# Patient Record
Sex: FEMALE | Race: WHITE | NOT HISPANIC OR LATINO | Employment: FULL TIME | URBAN - METROPOLITAN AREA
[De-identification: names, ages, dates, MRNs, and addresses within clinical notes are randomized per-mention and may not be internally consistent; named-entity substitution may affect disease eponyms.]

---

## 2017-01-16 RX ORDER — FERROUS SULFATE 325(65) MG
TABLET ORAL
Qty: 30 TABLET | Refills: 2 | OUTPATIENT
Start: 2017-01-16

## 2017-01-19 ENCOUNTER — TELEPHONE (OUTPATIENT)
Dept: ONCOLOGY | Facility: CLINIC | Age: 37
End: 2017-01-19

## 2017-01-19 NOTE — TELEPHONE ENCOUNTER
----- Message from Mary Beth Jaquez sent at 1/19/2017  3:31 PM EST -----  Regarding: MICHAEL/ SCRIPT  Contact: [phone]  PATIENT STATED HER SCRIPT NEEDS A PA. (XARELTO)  PLEASE CALL PT TO DISCUSS    PHARMACY [facility] IN [place]  [id number] [phone]

## 2017-01-19 NOTE — TELEPHONE ENCOUNTER
Advised patient I would speak with Dr Mix regarding this, since she has not been seen in over a year. Per last office note, she needed to be seen again in 3 months. Will call patient back tomorrow after discussion

## 2017-01-23 ENCOUNTER — TELEPHONE (OUTPATIENT)
Dept: ONCOLOGY | Facility: CLINIC | Age: 37
End: 2017-01-23

## 2017-01-23 NOTE — TELEPHONE ENCOUNTER
----- Message from Nohemi De La Cruz sent at 1/23/2017  3:21 PM EST -----  Regarding: BADIN - MEDICATION PA  Contact: [phone]  Patient has had a massive embolism that  blocked off one lung and the swelled the heart last January and she wants to make sure she has her medication. She had talked to Tori last week regarding her insurance needing a PA on the Xarelto .      Patient is almost out of medication.       Please call patient [phone]

## 2017-02-06 ENCOUNTER — OFFICE VISIT (OUTPATIENT)
Dept: FAMILY MEDICINE CLINIC | Facility: CLINIC | Age: 37
End: 2017-02-06

## 2017-02-06 VITALS
DIASTOLIC BLOOD PRESSURE: 66 MMHG | BODY MASS INDEX: 32.66 KG/M2 | HEART RATE: 72 BPM | SYSTOLIC BLOOD PRESSURE: 110 MMHG | WEIGHT: 227.6 LBS | TEMPERATURE: 98.8 F | RESPIRATION RATE: 20 BRPM

## 2017-02-06 DIAGNOSIS — H66.002 ACUTE SUPPURATIVE OTITIS MEDIA OF LEFT EAR WITHOUT SPONTANEOUS RUPTURE OF TYMPANIC MEMBRANE, RECURRENCE NOT SPECIFIED: Primary | ICD-10-CM

## 2017-02-06 DIAGNOSIS — R05.9 COUGH: ICD-10-CM

## 2017-02-06 DIAGNOSIS — J02.9 SORE THROAT: ICD-10-CM

## 2017-02-06 LAB
EXPIRATION DATE: NORMAL
EXPIRATION DATE: NORMAL
FLUAV AG NPH QL: NEGATIVE
FLUBV AG NPH QL: NEGATIVE
INTERNAL CONTROL: NORMAL
INTERNAL CONTROL: NORMAL
Lab: NORMAL
Lab: NORMAL
S PYO AG THROAT QL: NEGATIVE

## 2017-02-06 PROCEDURE — 87804 INFLUENZA ASSAY W/OPTIC: CPT | Performed by: FAMILY MEDICINE

## 2017-02-06 PROCEDURE — 99213 OFFICE O/P EST LOW 20 MIN: CPT | Performed by: FAMILY MEDICINE

## 2017-02-06 PROCEDURE — 87880 STREP A ASSAY W/OPTIC: CPT | Performed by: FAMILY MEDICINE

## 2017-02-06 RX ORDER — DEXTROMETHORPHAN HYDROBROMIDE AND PROMETHAZINE HYDROCHLORIDE 15; 6.25 MG/5ML; MG/5ML
5 SYRUP ORAL 4 TIMES DAILY PRN
Qty: 180 ML | Refills: 0 | Status: SHIPPED | OUTPATIENT
Start: 2017-02-06 | End: 2017-02-08

## 2017-02-06 RX ORDER — METHYLPREDNISOLONE 4 MG/1
TABLET ORAL
Qty: 21 TABLET | Refills: 0 | Status: SHIPPED | OUTPATIENT
Start: 2017-02-06 | End: 2017-03-03

## 2017-02-06 RX ORDER — CEFDINIR 300 MG/1
300 CAPSULE ORAL 2 TIMES DAILY
Qty: 14 CAPSULE | Refills: 0 | Status: SHIPPED | OUTPATIENT
Start: 2017-02-06 | End: 2017-02-13

## 2017-02-06 RX ORDER — FERROUS SULFATE 325(65) MG
325 TABLET ORAL
COMMUNITY
End: 2017-12-27

## 2017-02-06 NOTE — PROGRESS NOTES
Subjective   Elisabeth Bustos is a 37 y.o. female.     History of Present Illness     Upper Respiratory Infection  Patient complains of symptoms of a URI. Symptoms include congestion, fever 101, non productive cough, sore throat and body aches. Onset of symptoms was 1 day ago, and has been gradually worsening since that time. Treatment to date: Theraflu.   She is a  and is exposed to multiple sick people     The following portions of the patient's history were reviewed and updated as appropriate: allergies, current medications, past family history, past medical history, past social history, past surgical history and problem list.    Review of Systems   Constitutional: Positive for chills, fatigue and fever.   HENT: Positive for sore throat. Negative for congestion, ear discharge, ear pain, postnasal drip, rhinorrhea, sinus pressure and trouble swallowing.    Respiratory: Positive for cough.    Cardiovascular: Negative for chest pain.   Gastrointestinal: Positive for diarrhea. Negative for nausea and vomiting.   Skin: Negative for rash.   Neurological: Positive for headaches. Negative for dizziness.       Objective   Physical Exam   Constitutional: She is oriented to person, place, and time. She appears well-developed and well-nourished.   HENT:   Head: Normocephalic and atraumatic.   Right Ear: Hearing, tympanic membrane, external ear and ear canal normal.   Left Ear: Hearing, external ear and ear canal normal. Tympanic membrane is injected and erythematous. A middle ear effusion (suppurative) is present.   Nose: Nose normal.   Mouth/Throat: Uvula is midline, oropharynx is clear and moist and mucous membranes are normal.   Eyes: Conjunctivae and EOM are normal.   Neck: Normal range of motion. Neck supple.   Cardiovascular: Normal rate, regular rhythm and normal heart sounds.    No murmur heard.  Pulmonary/Chest: Effort normal and breath sounds normal. No respiratory distress. She has no wheezes.    Musculoskeletal: She exhibits no edema.   Lymphadenopathy:     She has no cervical adenopathy.   Neurological: She is alert and oriented to person, place, and time.   Skin: Skin is warm and dry.   Psychiatric: She has a normal mood and affect. Her behavior is normal.   Nursing note and vitals reviewed.      Assessment/Plan   Elisabeth was seen today for cough, sore throat, fever, uri and generalized body aches.    Diagnoses and all orders for this visit:    Acute suppurative otitis media of left ear without spontaneous rupture of tympanic membrane, recurrence not specified  -     cefdinir (OMNICEF) 300 MG capsule; Take 1 capsule by mouth 2 (Two) Times a Day for 7 days.  -     MethylPREDNISolone (MEDROL, NANCI,) 4 MG tablet; Take as directed on package instructions.    Cough  -     POC Rapid Strep A  -     POC Influenza A / B  -     MethylPREDNISolone (MEDROL, NANCI,) 4 MG tablet; Take as directed on package instructions.  -     promethazine-dextromethorphan (PROMETHAZINE-DM) 6.25-15 MG/5ML syrup; Take 5 mL by mouth 4 (Four) Times a Day As Needed for cough.    Sore throat  -     POC Rapid Strep A  -     POC Influenza A / B  -     MethylPREDNISolone (MEDROL, NANCI,) 4 MG tablet; Take as directed on package instructions.      Condition treated with Omnicef, steroid pack, and cough syrup  Strep and influenza swab negative   Go to the nearest ER or return to clinic if symptoms worsen, fever/chill develop    Mariann Vega DO

## 2017-02-06 NOTE — PATIENT INSTRUCTIONS
Go to the nearest ER or return to clinic if symptoms worsen, fever/chill develop      Otitis Media, Adult  Otitis media is redness, soreness, and puffiness (swelling) in the space just behind your eardrum (middle ear). It may be caused by allergies or infection. It often happens along with a cold.  HOME CARE  · Take your medicine as told. Finish it even if you start to feel better.  · Only take over-the-counter or prescription medicines for pain, discomfort, or fever as told by your doctor.  · Follow up with your doctor as told.  GET HELP IF:  · You have otitis media only in one ear, or bleeding from your nose, or both.  · You notice a lump on your neck.  · You are not getting better in 3-5 days.  · You feel worse instead of better.  GET HELP RIGHT AWAY IF:   · You have pain that is not helped with medicine.  · You have puffiness, redness, or pain around your ear.  · You get a stiff neck.  · You cannot move part of your face (paralysis).  · You notice that the bone behind your ear hurts when you touch it.  MAKE SURE YOU:   · Understand these instructions.  · Will watch your condition.  · Will get help right away if you are not doing well or get worse.     This information is not intended to replace advice given to you by your health care provider. Make sure you discuss any questions you have with your health care provider.     Document Released: 06/05/2009 Document Revised: 01/08/2016 Document Reviewed: 07/15/2014  [employer] Interactive Patient Education ©2016 [employer]

## 2017-02-08 ENCOUNTER — TELEPHONE (OUTPATIENT)
Dept: FAMILY MEDICINE CLINIC | Facility: CLINIC | Age: 37
End: 2017-02-08

## 2017-02-08 RX ORDER — BROMPHENIRAMINE MALEATE, PSEUDOEPHEDRINE HYDROCHLORIDE, AND DEXTROMETHORPHAN HYDROBROMIDE 2; 30; 10 MG/5ML; MG/5ML; MG/5ML
5 SYRUP ORAL 4 TIMES DAILY PRN
Qty: 118 ML | Refills: 0 | Status: SHIPPED | OUTPATIENT
Start: 2017-02-08 | End: 2017-12-27

## 2017-02-08 NOTE — TELEPHONE ENCOUNTER
----- Message from Talia Ware sent at 2/8/2017  9:32 AM EST -----  Contact: MEREDITH NIXON  THE PATIENT CALLED TO ADVISE THAT HER COUGH SYRUP IS NOT WORKING AND SHE IS OUT IS REQUESTING THAT SOMETHING STRONGER BE SCRIPTED CAN THIS BE DONE HER PHARMACY IS [facility] PATIENT CAN BE REACHE GARETT  OK TO LEAVE [facility] [place]

## 2017-02-09 ENCOUNTER — OFFICE VISIT (OUTPATIENT)
Dept: CARDIOLOGY | Facility: CLINIC | Age: 37
End: 2017-02-09

## 2017-02-09 VITALS
WEIGHT: 229.8 LBS | BODY MASS INDEX: 32.9 KG/M2 | HEIGHT: 70 IN | HEART RATE: 59 BPM | DIASTOLIC BLOOD PRESSURE: 68 MMHG | SYSTOLIC BLOOD PRESSURE: 114 MMHG

## 2017-02-09 DIAGNOSIS — I26.99 BILATERAL PULMONARY EMBOLISM (HCC): Primary | ICD-10-CM

## 2017-02-09 PROCEDURE — 99213 OFFICE O/P EST LOW 20 MIN: CPT | Performed by: INTERNAL MEDICINE

## 2017-02-09 NOTE — PROGRESS NOTES
Elisabeth Bustos  1980  [phone]    2017    Remi Newman MD    Chief Complaint   Patient presents with   • bilateral pulmonary emboli     PROBLEM LIST:   1. Bilateral pulmonary emboli.  a. Significantly greater on the right than the left.  b. Echocardiogram 2016, revealing an LVEF  of 50% to 55% with a dilated RV and reduced function noted.  Trace AI, mild MR, moderate TR with no RVSP of 60-65 mmHg.    c. Cardiac catheterization, 2016: Successful EKOS placement to the right pulmonary artery, x24 hours.  d. 2016, TTE: EF < 50%.  e. Her hypercoagulable workup was negative.   f. Limited echo, 2016: RVSP <30. Normal RV size and function.   g. Family history of pulmonary emboli in father and maternal grandmother, DVT in mother and paternal grandmother.   2. Oral contraceptives use at time of pulmonary emboli.   3. Migraine headache.   4. Exercise-induced asthma.  5.  Past surgical history:   a. Bilateral hernia repair.   b.   section.   c. Laser laryngoscopy.   d. Breast augmentation.   e. Cholecystectomy, 2015.    No Known Allergies    Current Medications:      Current Outpatient Prescriptions:   •  Acetaminophen (TYLENOL PO), Take  by mouth as needed., Disp: , Rfl:   •  albuterol (PROVENTIL HFA;VENTOLIN HFA) 108 (90 BASE) MCG/ACT inhaler, Inhale 2 puffs every 4 (four) hours as needed for wheezing., Disp: 6.7 g, Rfl: 11  •  bisoprolol (ZEBETA) 5 MG tablet, Take 1 tablet by mouth daily., Disp: 90 tablet, Rfl: 3  •  brompheniramine-pseudoephedrine-DM 30-2-10 MG/5ML syrup, Take 5 mL by mouth 4 (Four) Times a Day As Needed for congestion or cough., Disp: 118 mL, Rfl: 0  •  cefdinir (OMNICEF) 300 MG capsule, Take 1 capsule by mouth 2 (Two) Times a Day for 7 days., Disp: 14 capsule, Rfl: 0  •  EPINEPHrine (EPIPEN 2-NANCI) 0.3 MG/0.3ML solution auto-injector injection, Use x 1 if needed for sting reaction, Disp: 1 each, Rfl: 2  •  ferrous sulfate 325 (65 FE) MG tablet,  "Take 325 mg by mouth Daily With Breakfast., Disp: , Rfl:   •  Lidocaine-Prilocaine, Bulk, 2.5-2.5 % cream, As Needed., Disp: , Rfl:   •  MethylPREDNISolone (MEDROL, NANCI,) 4 MG tablet, Take as directed on package instructions., Disp: 21 tablet, Rfl: 0  •  rivaroxaban (XARELTO) 20 MG tablet, Take 1 tablet by mouth Daily., Disp: 90 tablet, Rfl: 0    HPI    Ms. Bustos presents today for 6 month follow up for bilateral pulmonary emboli. Since last visit, she reports feeling well from a cardiac standpoint. Her breathing has improved since being prescribed an albuterol inhaler for exercise-induced asthma. Patient denies chest pain, palpitations, edema, PND, orthopnea, dizziness, and syncope. She keeps active doing one hour of aerobic exercise, weights, and high-intensity dance classes 3-4 times a week with no cardiac complaints; in addition, she has been consuming a diet low in sugar and processed foods. Despite these efforts she continues to have difficulty losing weight, stating that she has gained 30 lbs over the past three years. She is unsure as to whether her weight gain is hormonal, but mentions that she has had to stop taking oral contraceptives secondary to her pulmonary emboli. She notes that her father and maternal grandmother also suffered pulmonary emboli. Her mother and paternal grandmother had a DVT.      The following portions of the patient's history were reviewed and updated as appropriate: allergies, current medications and problem list.    Pertinent positives as listed in the HPI.  All other systems reviewed are negative.    Vitals:    02/09/17 1430   BP: 114/68   BP Location: Left arm   Patient Position: Sitting   Pulse: 59   Weight: 229 lb 12.8 oz (104 kg)   Height: 70\" (177.8 cm)     General: Alert and oriented  Neck: Jugular venous pressure is within normal limits. Carotids have normal upstrokes without bruits.   Cardiovascular: Heart has a nondisplaced focal PMI. Regular rate and rhythm without " murmur, gallop or rub.  Lungs: Clear without rales or wheezes. Equal expansion is noted.   Extremities: Show no edema.  Skin: warm and dry.  Neurologic: nonfocal    Diagnostic Data:    Lab Results   Component Value Date    TSH 1.224 09/15/2016    F8ZTTSE 7.40 09/15/2016     Procedures    Assessment:      ICD-10-CM ICD-9-CM   1. Bilateral pulmonary embolism I26.99 415.19     Plan:    1. Recommended carb cycling diet for weight loss.   2. Will call Dr. Mix after she sees him tomorrow.   3. Continue current medications.  4. F/up in 6 months or sooner if needed.    Scribed for Klarissa Green MD by Edith Mcdowell. 2/9/2017  2:51 PM    I Klarissa Green MD personally performed the services described in this documentation as scribed by the above individual in my presence, and it is both accurate and complete.

## 2017-02-10 ENCOUNTER — APPOINTMENT (OUTPATIENT)
Dept: LAB | Facility: HOSPITAL | Age: 37
End: 2017-02-10

## 2017-02-10 ENCOUNTER — OFFICE VISIT (OUTPATIENT)
Dept: ONCOLOGY | Facility: CLINIC | Age: 37
End: 2017-02-10

## 2017-02-10 VITALS
BODY MASS INDEX: 32.78 KG/M2 | HEART RATE: 50 BPM | RESPIRATION RATE: 16 BRPM | DIASTOLIC BLOOD PRESSURE: 70 MMHG | TEMPERATURE: 98.2 F | HEIGHT: 70 IN | WEIGHT: 229 LBS | SYSTOLIC BLOOD PRESSURE: 119 MMHG

## 2017-02-10 DIAGNOSIS — I26.99 BILATERAL PULMONARY EMBOLISM (HCC): Primary | ICD-10-CM

## 2017-02-10 LAB
ALBUMIN SERPL-MCNC: 4 G/DL (ref 3.2–4.8)
ALBUMIN/GLOB SERPL: 1.7 G/DL (ref 1.5–2.5)
ALP SERPL-CCNC: 64 U/L (ref 25–100)
ALT SERPL W P-5'-P-CCNC: 15 U/L (ref 7–40)
ANION GAP SERPL CALCULATED.3IONS-SCNC: 6 MMOL/L (ref 3–11)
AST SERPL-CCNC: 13 U/L (ref 0–33)
BILIRUB SERPL-MCNC: 0.4 MG/DL (ref 0.3–1.2)
BUN BLD-MCNC: 16 MG/DL (ref 9–23)
BUN/CREAT SERPL: 20 (ref 7–25)
CALCIUM SPEC-SCNC: 9.1 MG/DL (ref 8.7–10.4)
CHLORIDE SERPL-SCNC: 105 MMOL/L (ref 99–109)
CO2 SERPL-SCNC: 30 MMOL/L (ref 20–31)
CREAT BLD-MCNC: 0.8 MG/DL (ref 0.6–1.3)
ERYTHROCYTE [DISTWIDTH] IN BLOOD BY AUTOMATED COUNT: 15.3 % (ref 11.3–14.5)
FERRITIN SERPL-MCNC: 10 NG/ML (ref 10–291)
FOLATE SERPL-MCNC: 6.28 NG/ML (ref 3.2–20)
GFR SERPL CREATININE-BSD FRML MDRD: 80 ML/MIN/1.73
GLOBULIN UR ELPH-MCNC: 2.3 GM/DL
GLUCOSE BLD-MCNC: 80 MG/DL (ref 70–100)
HCT VFR BLD AUTO: 38.8 % (ref 34.5–44)
HGB BLD-MCNC: 12.7 G/DL (ref 11.5–15.5)
IRON 24H UR-MRATE: 33 MCG/DL (ref 50–175)
IRON SATN MFR SERPL: 9 % (ref 15–50)
LYMPHOCYTES # BLD AUTO: 2 10*3/MM3 (ref 0.6–4.8)
LYMPHOCYTES NFR BLD AUTO: 26.6 % (ref 24–44)
MCH RBC QN AUTO: 26.1 PG (ref 27–31)
MCHC RBC AUTO-ENTMCNC: 32.9 G/DL (ref 32–36)
MCV RBC AUTO: 79.3 FL (ref 80–99)
MONOCYTES # BLD AUTO: 0.5 10*3/MM3 (ref 0–1)
MONOCYTES NFR BLD AUTO: 6.5 % (ref 0–12)
NEUTROPHILS # BLD AUTO: 5 10*3/MM3 (ref 1.5–8.3)
NEUTROPHILS NFR BLD AUTO: 66.9 % (ref 41–71)
PLATELET # BLD AUTO: 274 10*3/MM3 (ref 150–450)
PMV BLD AUTO: 8 FL (ref 6–12)
POTASSIUM BLD-SCNC: 4.4 MMOL/L (ref 3.5–5.5)
PROT SERPL-MCNC: 6.3 G/DL (ref 5.7–8.2)
RBC # BLD AUTO: 4.89 10*6/MM3 (ref 3.89–5.14)
SODIUM BLD-SCNC: 141 MMOL/L (ref 132–146)
TIBC SERPL-MCNC: 365 MCG/DL (ref 250–450)
VIT B12 BLD-MCNC: 363 PG/ML (ref 211–911)
WBC NRBC COR # BLD: 7.4 10*3/MM3 (ref 3.5–10.8)

## 2017-02-10 PROCEDURE — 81241 F5 GENE: CPT | Performed by: INTERNAL MEDICINE

## 2017-02-10 PROCEDURE — 82728 ASSAY OF FERRITIN: CPT | Performed by: INTERNAL MEDICINE

## 2017-02-10 PROCEDURE — 86147 CARDIOLIPIN ANTIBODY EA IG: CPT | Performed by: INTERNAL MEDICINE

## 2017-02-10 PROCEDURE — 85302 CLOT INHIBIT PROT C ANTIGEN: CPT | Performed by: INTERNAL MEDICINE

## 2017-02-10 PROCEDURE — 85300 ANTITHROMBIN III ACTIVITY: CPT | Performed by: INTERNAL MEDICINE

## 2017-02-10 PROCEDURE — 85306 CLOT INHIBIT PROT S FREE: CPT | Performed by: INTERNAL MEDICINE

## 2017-02-10 PROCEDURE — 85613 RUSSELL VIPER VENOM DILUTED: CPT | Performed by: INTERNAL MEDICINE

## 2017-02-10 PROCEDURE — 80053 COMPREHEN METABOLIC PANEL: CPT | Performed by: INTERNAL MEDICINE

## 2017-02-10 PROCEDURE — 36415 COLL VENOUS BLD VENIPUNCTURE: CPT | Performed by: INTERNAL MEDICINE

## 2017-02-10 PROCEDURE — 85210 CLOT FACTOR II PROTHROM SPEC: CPT | Performed by: INTERNAL MEDICINE

## 2017-02-10 PROCEDURE — 85730 THROMBOPLASTIN TIME PARTIAL: CPT | Performed by: INTERNAL MEDICINE

## 2017-02-10 PROCEDURE — 86146 BETA-2 GLYCOPROTEIN ANTIBODY: CPT | Performed by: INTERNAL MEDICINE

## 2017-02-10 PROCEDURE — 83550 IRON BINDING TEST: CPT | Performed by: INTERNAL MEDICINE

## 2017-02-10 PROCEDURE — 99214 OFFICE O/P EST MOD 30 MIN: CPT | Performed by: INTERNAL MEDICINE

## 2017-02-10 PROCEDURE — 85305 CLOT INHIBIT PROT S TOTAL: CPT | Performed by: INTERNAL MEDICINE

## 2017-02-10 PROCEDURE — 82607 VITAMIN B-12: CPT | Performed by: INTERNAL MEDICINE

## 2017-02-10 PROCEDURE — 82746 ASSAY OF FOLIC ACID SERUM: CPT | Performed by: INTERNAL MEDICINE

## 2017-02-10 PROCEDURE — 83540 ASSAY OF IRON: CPT | Performed by: INTERNAL MEDICINE

## 2017-02-10 PROCEDURE — 85303 CLOT INHIBIT PROT C ACTIVITY: CPT | Performed by: INTERNAL MEDICINE

## 2017-02-10 PROCEDURE — 85025 COMPLETE CBC W/AUTO DIFF WBC: CPT | Performed by: INTERNAL MEDICINE

## 2017-02-10 NOTE — PROGRESS NOTES
DATE OF VISIT: 2/10/2017    REASON FOR VISIT:   1. Bilateral PE.  2. Right lower extremity DVT.     HISTORY OF PRESENT ILLNESS: The patient is a very pleasant 36-year-old female  with past medical history significant for bilateral PE with right lower  extremity DVT diagnosed 01/04/2016. The patient is status post EKOS  thrombolysis with anticoagulation. Currently she is on Xarelto 20 mg p.o.  daily. The patient is here today in scheduled followup visit.     SUBJECTIVE: The patient has been doing fairly well. Her breathing is  significantly improved; however, she continues to complain of shortness of  breath with exertion.  She was diagnosed with exercise-induced asthma and she is currently on albuterol inhaler.  She also complains of difficulty losing weight despite being on diet as well as exercise program.     REVIEW OF SYSTEMS: All the other 9 systems are reviewed by me and negative  except what is mentioned in HPI.      PAST MEDICAL HISTORY/SOCIAL HISTORY/FAMILY HISTORY: Unchanged from my prior  documentation done 01/05/2016.      Review of Systems   Constitutional: Negative for activity change, appetite change, chills, fatigue, fever and unexpected weight change.   HENT: Negative for hearing loss, mouth sores, nosebleeds, sore throat and trouble swallowing.    Eyes: Negative for visual disturbance.   Respiratory: Negative for cough, chest tightness, shortness of breath and wheezing.    Cardiovascular: Negative for chest pain, palpitations and leg swelling.   Gastrointestinal: Negative for abdominal distention, abdominal pain, blood in stool, constipation, diarrhea, nausea, rectal pain and vomiting.   Endocrine: Negative for cold intolerance and heat intolerance.   Genitourinary: Negative for difficulty urinating, dysuria, frequency and urgency.   Musculoskeletal: Negative for arthralgias, back pain, gait problem, joint swelling and myalgias.   Skin: Negative for rash.   Neurological: Negative for dizziness,  "tremors, syncope, weakness, light-headedness, numbness and headaches.   Hematological: Negative for adenopathy. Does not bruise/bleed easily.   Psychiatric/Behavioral: Negative for confusion, sleep disturbance and suicidal ideas. The patient is not nervous/anxious.          Current Outpatient Prescriptions:   •  Acetaminophen (TYLENOL PO), Take  by mouth as needed., Disp: , Rfl:   •  albuterol (PROVENTIL HFA;VENTOLIN HFA) 108 (90 BASE) MCG/ACT inhaler, Inhale 2 puffs every 4 (four) hours as needed for wheezing., Disp: 6.7 g, Rfl: 11  •  bisoprolol (ZEBETA) 5 MG tablet, Take 1 tablet by mouth daily., Disp: 90 tablet, Rfl: 3  •  brompheniramine-pseudoephedrine-DM 30-2-10 MG/5ML syrup, Take 5 mL by mouth 4 (Four) Times a Day As Needed for congestion or cough., Disp: 118 mL, Rfl: 0  •  cefdinir (OMNICEF) 300 MG capsule, Take 1 capsule by mouth 2 (Two) Times a Day for 7 days., Disp: 14 capsule, Rfl: 0  •  EPINEPHrine (EPIPEN 2-NANCI) 0.3 MG/0.3ML solution auto-injector injection, Use x 1 if needed for sting reaction, Disp: 1 each, Rfl: 2  •  ferrous sulfate 325 (65 FE) MG tablet, Take 325 mg by mouth Daily With Breakfast., Disp: , Rfl:   •  Lidocaine-Prilocaine, Bulk, 2.5-2.5 % cream, As Needed., Disp: , Rfl:   •  MethylPREDNISolone (MEDROL, NANCI,) 4 MG tablet, Take as directed on package instructions., Disp: 21 tablet, Rfl: 0  •  rivaroxaban (XARELTO) 20 MG tablet, Take 1 tablet by mouth Daily., Disp: 90 tablet, Rfl: 0    PHYSICAL EXAMINATION:   Visit Vitals   • Ht 70\" (177.8 cm)   • BMI 32.97 kg/m2      ECOG Performance Status: 1 - Symptomatic but completely ambulatory  General Appearance:  alert, cooperative, no apparent distress and appears stated age   Neurologic/Psychiatric: A&O x 3, gait steady, appropriate affect, strength 5/5 in all muscle groups   HEENT:  Normocephalic, without obvious abnormality, mucous membranes moist   Neck: Supple, symmetrical, trachea midline, no adenopathy;  No thyromegaly, masses, or " tenderness   Lungs:   Clear to auscultation bilaterally; respirations regular, even, and unlabored bilaterally   Heart:  Regular rate and rhythm, no murmurs appreciated   Abdomen:   Soft, non-tender, non-distended and no organomegaly   Lymph nodes: No cervical, supraclavicular, inguinal or axillary adenopathy noted   Extremities: Normal, atraumatic; no clubbing, cyanosis, or edema    Skin: No rashes, ulcers, or suspicious lesions noted     Office Visit on 02/06/2017   Component Date Value Ref Range Status   • Rapid Strep A Screen 02/06/2017 Negative  Negative, VALID, INVALID, Not Performed Final   • Internal Control 02/06/2017 Passed  Passed Final   • Lot Number 02/06/2017 [id number]   Final   • Expiration Date 02/06/2017 3/18   Final   • Rapid Influenza A Ag 02/06/2017 NEGATIVE   Final   • Rapid Influenza B Ag 02/06/2017 NEGATIVE   Final   • Internal Control 02/06/2017 Passed  Passed Final   • Lot Number 02/06/2017 [id number]   Final   • Expiration Date 02/06/2017 12/17   Final        No results found.    ASSESSMENT: The patient is a very pleasant 36-year-old female with bilateral  PE.     PROBLEM LIST:  1. Bilateral PE diagnosed 01/04/2016 status post EKOS thrombolysis.  2. Right lower extremity DVT, above and below the knee.  3. On Xarelto 20 mg p.o. daily, started 01/06/2016.  4.  Obesity     PLAN:  1.  I will go ahead and order full thrombophilia workup today.  2. Patient was advised to stay off oral contraceptive pills.  3. Patient was advised to exercise and lose weight.  I told the patient obesity is a risk factor for venous thrombotic events.  He benefits thrombophilia workup comes back negative I think she will always be at risk of having another episode of blood clots.  4. I told the patient that she needs to wear compression stockings mainly at  work while she is standing up on her feet. Patient works as a hairdresser.   5. Patient will come back to see me in 3 weeks.  .       Rika Mix MD  2/10/2017

## 2017-02-11 LAB
CARDIOLIPIN IGG SER IA-ACNC: <9 GPL U/ML (ref 0–14)
CARDIOLIPIN IGM SER IA-ACNC: <9 MPL U/ML (ref 0–12)

## 2017-02-12 LAB
AT III PPP CHRO-ACNC: 94 % (ref 75–135)
B2 GLYCOPROT1 IGA SER-ACNC: <9 GPI IGA UNITS (ref 0–25)
B2 GLYCOPROT1 IGG SER-ACNC: <9 GPI IGG UNITS (ref 0–20)
B2 GLYCOPROT1 IGM SER-ACNC: <9 GPI IGM UNITS (ref 0–32)
PROTEIN S-FUNCTIONAL: 81 % (ref 63–140)
PROTHROM ACT/NOR PPP: 109 % (ref 50–154)

## 2017-02-15 LAB
PROT C AG PPP IA-ACNC: 119 % (ref 60–150)
PROT S FREE PPP-ACNC: 84 % (ref 57–157)
PROT S PPP-ACNC: 106 % (ref 60–150)
PRT C ACTIVITY (CHROMOGENIC): 148 %

## 2017-02-16 LAB
F5 GENE MUT ANL BLD/T: NORMAL
LABORATORY COMMENT REPORT: NORMAL
REF LAB TEST METHOD: NORMAL

## 2017-03-03 ENCOUNTER — OFFICE VISIT (OUTPATIENT)
Dept: ONCOLOGY | Facility: CLINIC | Age: 37
End: 2017-03-03

## 2017-03-03 VITALS
WEIGHT: 227 LBS | RESPIRATION RATE: 16 BRPM | SYSTOLIC BLOOD PRESSURE: 138 MMHG | HEIGHT: 70 IN | DIASTOLIC BLOOD PRESSURE: 73 MMHG | HEART RATE: 59 BPM | TEMPERATURE: 97.6 F | BODY MASS INDEX: 32.5 KG/M2

## 2017-03-03 DIAGNOSIS — Z86.718 HISTORY OF DEEP VENOUS THROMBOSIS: ICD-10-CM

## 2017-03-03 DIAGNOSIS — I26.99 BILATERAL PULMONARY EMBOLISM (HCC): Primary | ICD-10-CM

## 2017-03-03 PROCEDURE — 99214 OFFICE O/P EST MOD 30 MIN: CPT | Performed by: INTERNAL MEDICINE

## 2017-03-03 RX ORDER — FAMOTIDINE 10 MG/ML
20 INJECTION, SOLUTION INTRAVENOUS ONCE
Status: CANCELLED | OUTPATIENT
Start: 2017-03-10

## 2017-03-03 RX ORDER — SODIUM CHLORIDE 9 MG/ML
250 INJECTION, SOLUTION INTRAVENOUS ONCE
Status: CANCELLED | OUTPATIENT
Start: 2017-03-10

## 2017-03-03 RX ORDER — FAMOTIDINE 10 MG/ML
20 INJECTION, SOLUTION INTRAVENOUS ONCE
Status: CANCELLED | OUTPATIENT
Start: 2017-03-17

## 2017-03-03 RX ORDER — SODIUM CHLORIDE 9 MG/ML
250 INJECTION, SOLUTION INTRAVENOUS ONCE
Status: CANCELLED | OUTPATIENT
Start: 2017-03-17

## 2017-03-03 RX ORDER — DIPHENHYDRAMINE HCL 25 MG
25 CAPSULE ORAL ONCE
Status: CANCELLED | OUTPATIENT
Start: 2017-03-17

## 2017-03-03 RX ORDER — DIPHENHYDRAMINE HCL 25 MG
25 CAPSULE ORAL ONCE
Status: CANCELLED | OUTPATIENT
Start: 2017-03-10

## 2017-03-03 NOTE — PROGRESS NOTES
DATE OF VISIT: 3/3/2017    REASON FOR VISIT:   1. Bilateral PE.  2. Right lower extremity DVT.     HISTORY OF PRESENT ILLNESS: The patient is a very pleasant 36-year-old female  with past medical history significant for bilateral PE with right lower  extremity DVT diagnosed 01/04/2016. The patient is status post EKOS  thrombolysis with anticoagulation. Currently she is on Xarelto 20 mg p.o.  daily. The patient is here today in scheduled followup visit.     SUBJECTIVE: The patient has been doing fairly well. She still complains of some mild shortness of breath with activity, improved with use of an inhaler as needed. She is having some mild residual swelling in her legs at times, and wears compression stockings as needed. She denies chest pain or cough. She is having no bleeding issues with use of Xarelto.      REVIEW OF SYSTEMS: All the other 9 systems are reviewed by me and negative  except what is mentioned in HPI.      PAST MEDICAL HISTORY/SOCIAL HISTORY/FAMILY HISTORY: Unchanged from my prior  documentation done 01/05/2016.      Review of Systems   Constitutional: Negative for activity change, appetite change, chills, fatigue, fever and unexpected weight change.   HENT: Negative for hearing loss, mouth sores, nosebleeds, sore throat and trouble swallowing.    Eyes: Negative for visual disturbance.   Respiratory: Positive for shortness of breath. Negative for cough, chest tightness and wheezing.    Cardiovascular: Positive for leg swelling. Negative for chest pain and palpitations.   Gastrointestinal: Negative for abdominal distention, abdominal pain, blood in stool, constipation, diarrhea, nausea, rectal pain and vomiting.   Endocrine: Negative for cold intolerance and heat intolerance.   Genitourinary: Negative for difficulty urinating, dysuria, frequency and urgency.   Musculoskeletal: Negative for arthralgias, back pain, gait problem, joint swelling and myalgias.   Skin: Negative for rash.   Neurological:  "Negative for dizziness, tremors, syncope, weakness, light-headedness, numbness and headaches.   Hematological: Negative for adenopathy. Does not bruise/bleed easily.   Psychiatric/Behavioral: Negative for confusion, sleep disturbance and suicidal ideas. The patient is not nervous/anxious.          Current Outpatient Prescriptions:   •  Acetaminophen (TYLENOL PO), Take  by mouth as needed., Disp: , Rfl:   •  albuterol (PROVENTIL HFA;VENTOLIN HFA) 108 (90 BASE) MCG/ACT inhaler, Inhale 2 puffs every 4 (four) hours as needed for wheezing., Disp: 6.7 g, Rfl: 11  •  brompheniramine-pseudoephedrine-DM 30-2-10 MG/5ML syrup, Take 5 mL by mouth 4 (Four) Times a Day As Needed for congestion or cough., Disp: 118 mL, Rfl: 0  •  EPINEPHrine (EPIPEN 2-NANCI) 0.3 MG/0.3ML solution auto-injector injection, Use x 1 if needed for sting reaction, Disp: 1 each, Rfl: 2  •  ferrous sulfate 325 (65 FE) MG tablet, Take 325 mg by mouth Daily With Breakfast., Disp: , Rfl:   •  Lidocaine-Prilocaine, Bulk, 2.5-2.5 % cream, As Needed., Disp: , Rfl:   •  rivaroxaban (XARELTO) 20 MG tablet, Take 1 tablet by mouth Daily., Disp: 90 tablet, Rfl: 0    PHYSICAL EXAMINATION:   Visit Vitals   • /73   • Pulse 59   • Temp 97.6 °F (36.4 °C) (Temporal Artery )   • Resp 16   • Ht 70\" (177.8 cm)   • Wt 227 lb (103 kg)   • BMI 32.57 kg/m2      ECOG Performance Status: 1 - Symptomatic but completely ambulatory  General Appearance:  alert, cooperative, no apparent distress and appears stated age   Neurologic/Psychiatric: A&O x 3, gait steady, appropriate affect, strength 5/5 in all muscle groups   HEENT:  Normocephalic, without obvious abnormality, mucous membranes moist   Neck: Supple, symmetrical, trachea midline, no adenopathy;  No thyromegaly, masses, or tenderness   Lungs:   Clear to auscultation bilaterally; respirations regular, even, and unlabored bilaterally   Heart:  Regular rate and rhythm, no murmurs appreciated   Abdomen:   Soft, non-tender, " non-distended and no organomegaly   Lymph nodes: No cervical, supraclavicular, inguinal or axillary adenopathy noted   Extremities: Normal, atraumatic; no clubbing, cyanosis, or edema    Skin: No rashes, ulcers, or suspicious lesions noted     No visits with results within 2 Week(s) from this visit.  Latest known visit with results is:    Office Visit on 02/10/2017   Component Date Value Ref Range Status   • Glucose 02/10/2017 80  70 - 100 mg/dL Final   • BUN 02/10/2017 16  9 - 23 mg/dL Final   • Creatinine 02/10/2017 0.80  0.60 - 1.30 mg/dL Final   • Sodium 02/10/2017 141  132 - 146 mmol/L Final   • Potassium 02/10/2017 4.4  3.5 - 5.5 mmol/L Final   • Chloride 02/10/2017 105  99 - 109 mmol/L Final   • CO2 02/10/2017 30.0  20.0 - 31.0 mmol/L Final   • Calcium 02/10/2017 9.1  8.7 - 10.4 mg/dL Final   • Total Protein 02/10/2017 6.3  5.7 - 8.2 g/dL Final   • Albumin 02/10/2017 4.00  3.20 - 4.80 g/dL Final   • ALT (SGPT) 02/10/2017 15  7 - 40 U/L Final   • AST (SGOT) 02/10/2017 13  0 - 33 U/L Final   • Alkaline Phosphatase 02/10/2017 64  25 - 100 U/L Final   • Total Bilirubin 02/10/2017 0.4  0.3 - 1.2 mg/dL Final   • eGFR Non African Amer 02/10/2017 80  >60 mL/min/1.73 Final   • Globulin 02/10/2017 2.3  gm/dL Final   • A/G Ratio 02/10/2017 1.7  1.5 - 2.5 g/dL Final   • BUN/Creatinine Ratio 02/10/2017 20.0  7.0 - 25.0 Final   • Anion Gap 02/10/2017 6.0  3.0 - 11.0 mmol/L Final   • Ferritin 02/10/2017 10.00  10.00 - 291.00 ng/mL Final   • Folate 02/10/2017 6.28  3.20 - 20.00 ng/mL Final   • Vitamin B-12 02/10/2017 363  211 - 911 pg/mL Final   • Iron 02/10/2017 33* 50 - 175 mcg/dL Final   • TIBC 02/10/2017 365  250 - 450 mcg/dL Final   • Iron Saturation 02/10/2017 9* 15 - 50 % Final   • Beta-2 Glyco 1 IgG 02/10/2017 <9  0 - 20 GPI IgG units Final    The reference interval reflects a 3SD or 99th percentile interval,  which is thought to represent a potentially clinically significant  result in accordance with the  International Consensus Statement on  the classification criteria for definitive antiphospholipid syndrome  (APS). J Thromb Haem 2006;4:295-306.   • Beta-2 Glyco 1 IgA 02/10/2017 <9  0 - 25 GPI IgA units Final    The reference interval reflects a 3SD or 99th percentile interval,  which is thought to represent a potentially clinically significant  result in accordance with the International Consensus Statement on  the classification criteria for definitive antiphospholipid syndrome  (APS). J Thromb Haem 2006;4:295-306.   • Beta-2 Glyco 1 IgM 02/10/2017 <9  0 - 32 GPI IgM units Final    The reference interval reflects a 3SD or 99th percentile interval,  which is thought to represent a potentially clinically significant  result in accordance with the International Consensus Statement on  the classification criteria for definitive antiphospholipid syndrome  (APS). J Thromb Haem 2006;4:295-306.   • Anticardiolipin IgG 02/10/2017 <9  0 - 14 GPL U/mL Final                              Negative:              <15                            Indeterminate:     15 - 20                            Low-Med Positive: >20 - 80                            High Positive:         >80   • Anticardiolipin IgM 02/10/2017 <9  0 - 12 MPL U/mL Final                              Negative:              <13                            Indeterminate:     13 - 20                            Low-Med Positive: >20 - 80                            High Positive:         >80   • AntiThromb III Func 02/10/2017 94  75 - 135 % Final    Direct thrombin inhibitor anticoagulants such as rivaroxaban,  apixaban and edoxaban will lead to spuriously elevated antithrombin  activity levels possibly masking a deficiency.   • Factor V Leiden 02/10/2017 Comment   Final    Comment: Result:  Negative (no mutation found)  Factor V Leiden is a specific mutation ([id number]) in the factor  V gene that is associated with an increased risk of venous  thrombosis. Factor V Leiden is  more resistant to  inactivation by activated protein C.  As a result, factor V  persists in the circulation leading to a mild hyper-  coagulable state.  The Leiden mutation accounts for 90% -  95% of APC resistance.  Factor V Leiden has been reported in  patients with deep vein thrombosis, pulmonary embolus,  central retinal vein occlusion, cerebral sinus thrombosis  and hepatic vein thrombosis. Other risk factors to be  considered in the workup for venous thrombosis include the  [id number] mutation in the factor II (prothrombin) gene,  protein S and C deficiency, and antithrombin deficiencies.  Anticardiolipin antibody and lupus anticoagulant analysis  may be appropriate for certain patients, as well as  homocysteine levels.  Contact your local [facility] for information on how to order  additional                            testing if desired.   • Comment 02/10/2017 Comment   Final    **Genetic counselors are available for health care providers to**    discuss results at [phone] ([phone]).  Methodology:  DNA analysis of the Factor V gene was performed by allele-specific  PCR. The diagnostic sensitivity and specificity is >99% for both.  Molecular-based testing is highly accurate, but as in any laboratory  test, diagnostic errors may occur. All test results must be combined  with clinical information for the most accurate interpretation.  References:  Yimi HOLDEN (1996).  Clin Lab Med 16:169-186.  Isaiah Reeves, PhD, [facility]  Shellie Conde, PhD, [facility]  Soraya Galeano, PhD, [facility]  Tamela Thayer M.S., PhD, FAC  Sinai Limon, PhD, [facility]  Analy Renner, PhD, [facility]  Navdeep oClvin PhD, [facility]   • Reference Lab Report 02/10/2017 See Attached Report   Final    See scanned report     • Prt C Activity (Chromogenic) 02/10/2017 148  % Final    Reference Range:  17 years and older: 73 - 180   • Protein S-Functional 02/10/2017 81  63 - 140 % Final    Protein S activity may be falsely increased (masking an  abnormal, low  result) in patients receiving direct Xa inhibitor (e.g., rivaroxaban,  apixaban, edoxaban) or a direct thrombin inhibitor (e.g., dabigatran)  anticoagulant treatment due to assay interference by these drugs.   • Protein C Antigen 02/10/2017 119  60 - 150 % Final   • Protein S Ag, Total 02/10/2017 106  60 - 150 % Final   • Protein S Ag, Free 02/10/2017 84  57 - 157 % Final   • Factor II Activity 02/10/2017 109  50 - 154 % Final   • WBC 02/10/2017 7.40  3.50 - 10.80 10*3/mm3 Final   • RBC 02/10/2017 4.89  3.89 - 5.14 10*6/mm3 Final   • Hemoglobin 02/10/2017 12.7  11.5 - 15.5 g/dL Final   • Hematocrit 02/10/2017 38.8  34.5 - 44.0 % Final   • RDW 02/10/2017 15.3* 11.3 - 14.5 % Final   • MCV 02/10/2017 79.3* 80.0 - 99.0 fL Final   • MCH 02/10/2017 26.1* 27.0 - 31.0 pg Final   • MCHC 02/10/2017 32.9  32.0 - 36.0 g/dL Final   • MPV 02/10/2017 8.0  6.0 - 12.0 fL Final   • Platelets 02/10/2017 274  150 - 450 10*3/mm3 Final   • Neutrophil % 02/10/2017 66.9  41.0 - 71.0 % Final   • Lymphocyte % 02/10/2017 26.6  24.0 - 44.0 % Final   • Monocyte % 02/10/2017 6.5  0.0 - 12.0 % Final   • Neutrophils, Absolute 02/10/2017 5.00  1.50 - 8.30 10*3/mm3 Final   • Lymphocytes, Absolute 02/10/2017 2.00  0.60 - 4.80 10*3/mm3 Final   • Monocytes, Absolute 02/10/2017 0.50  0.00 - 1.00 10*3/mm3 Final        No results found.    ASSESSMENT: The patient is a very pleasant 36-year-old female with bilateral  PE.     PROBLEM LIST:  1. Bilateral unprovoked PE diagnosed 01/04/2016 status post EKOS thrombolysis.  2. Right lower extremity DVT, above and below the knee.  3. On Xarelto 20 mg p.o. daily, started 01/06/2016.  4.  Obesity  5.  Mild iron deficiency without anemia     PLAN:  1. I reviewed the results of the thrombophilia work up with the patient. I told here there is no evidence of underlying hypercoagulable states.   2. At this point I gave the patient 2 options either continue with full anticoagulation lifelong for unprovoked  serious PEs versus holding anticoagulation and resume if she ever have another venous thrombotic event.  I am in favor of option 1 since summary and not sure exactly why she had the clot and herbal therapies were life-threatening.  Patient is agreeable and she would like to continue with lifelong anticoagulation no and associated risk of bleeding with Xarelto.  3. Patient was advised to stay off oral contraceptive pills.  4. Patient was advised to exercise and lose weight.  I told her that obesity is a risk factor for recurrent clots.    5. The patient should continue to wear compression stockings mainly at  work while she is standing up on her feet. Patient works as a hairdresser.   6.  I will go ahead and start the patient on oral iron once a day.  This is most likely related to polymenorrhagia.  7.  Patient will come back to see me on as needed schedule.     Scribed for Rika Mix MD by Zulay Nieves, DAGO. 3/3/2017  2:53 PM  Rika Mix MD  3/3/2017   I, Rika Mix MD, personally performed the services described in this documentation as scribed by the above named individual in my presence, and it is both accurate and complete.  3/3/2017  3:16 PM

## 2017-12-08 RX ORDER — ALBUTEROL SULFATE 90 MCG
HFA AEROSOL WITH ADAPTER (GRAM) INHALATION
Qty: 6.7 INHALER | Refills: 1 | OUTPATIENT
Start: 2017-12-08

## 2017-12-14 ENCOUNTER — TELEPHONE (OUTPATIENT)
Dept: FAMILY MEDICINE CLINIC | Facility: CLINIC | Age: 37
End: 2017-12-14

## 2017-12-14 DIAGNOSIS — T63.441A ANAPHYLACTIC REACTION TO BEE STING, ACCIDENTAL OR UNINTENTIONAL, INITIAL ENCOUNTER: ICD-10-CM

## 2017-12-14 RX ORDER — EPINEPHRINE 0.3 MG/.3ML
INJECTION SUBCUTANEOUS
Qty: 1 EACH | Refills: 2 | Status: SHIPPED | OUTPATIENT
Start: 2017-12-14 | End: 2020-09-24

## 2017-12-14 NOTE — TELEPHONE ENCOUNTER
----- Message from Carmelita Meyers sent at 12/14/2017  4:48 PM EST -----  Contact: DR. MEZA; MED REQUEST   PT IS NEEDING A NEW RX FOR THE EPI PEN DUE TO HER  WAS MISPLACED.      CALL BACK   [phone]

## 2017-12-27 ENCOUNTER — OFFICE VISIT (OUTPATIENT)
Dept: CARDIOLOGY | Facility: CLINIC | Age: 37
End: 2017-12-27

## 2017-12-27 VITALS
SYSTOLIC BLOOD PRESSURE: 142 MMHG | OXYGEN SATURATION: 97 % | HEART RATE: 57 BPM | DIASTOLIC BLOOD PRESSURE: 90 MMHG | BODY MASS INDEX: 32.7 KG/M2 | WEIGHT: 228.4 LBS | HEIGHT: 70 IN

## 2017-12-27 DIAGNOSIS — I26.99 BILATERAL PULMONARY EMBOLISM (HCC): Primary | ICD-10-CM

## 2017-12-27 PROCEDURE — 99213 OFFICE O/P EST LOW 20 MIN: CPT | Performed by: INTERNAL MEDICINE

## 2017-12-27 NOTE — PROGRESS NOTES
Elisabeth Bustos  1980  [phone]      2017    Remi Newman MD    Chief Complaint   Patient presents with   • Bilateral pulmonary embolism   • Shortness of Breath     PROBLEM LIST:   1. Bilateral pulmonary emboli.  a. Significantly greater on the right than the left.  b. Echocardiogram 2016, revealing an LVEF  of 50% to 55% with a dilated RV and reduced function noted.  Trace AI, mild MR, moderate TR with no RVSP of 60-65 mmHg.    c. Cardiac catheterization, 2016: Successful EKOS placement to the right pulmonary artery, x24 hours.  d. 2016, TTE: EF < 50%.  e. Her hypercoagulable workup was negative.   f. Limited echo, 2016: RVSP <30. Normal RV size and function.   g. Family history of pulmonary emboli in father and maternal grandmother, DVT in mother and paternal grandmother.   h. Lifelong anticoagulation  2. Oral contraceptives use at time of pulmonary emboli.   3. Migraine headache.   4. Exercise-induced asthma.  5. Past surgical history:   a. Bilateral hernia repair.   b.   section.   c. Laser laryngoscopy.   d. Breast augmentation.   e. Cholecystectomy, 2015.    No Known Allergies    Current Medications:      Current Outpatient Prescriptions:   •  Acetaminophen (TYLENOL PO), Take  by mouth as needed., Disp: , Rfl:   •  albuterol (PROVENTIL HFA;VENTOLIN HFA) 108 (90 BASE) MCG/ACT inhaler, Inhale 2 puffs every 4 (four) hours as needed for wheezing., Disp: 6.7 g, Rfl: 11  •  EPINEPHrine (EPIPEN 2-NANCI) 0.3 MG/0.3ML solution auto-injector injection, Use x 1 if needed for sting reaction, Disp: 1 each, Rfl: 2  •  Lidocaine-Prilocaine, Bulk, 2.5-2.5 % cream, As Needed., Disp: , Rfl:   •  rivaroxaban (XARELTO) 20 MG tablet, Take 1 tablet by mouth Daily., Disp: 90 tablet, Rfl: 5    HPI    Elisabeth Bustos presents today for 6 month follow up of bilateral pulmonary emboli. Since last visit, patient has been feeling well overall from a cardiovascular  "standpoint. She states that her breathing has been stable. She notes that she had lost 15 pounds and could tell a significant difference in her ability to breathe, but that she has recently put the weight back on. She does report some trace edema and is wearing compression stockings. Patient denies chest pain, palpitations, PND, orthopnea, dizziness, and syncope.     The following portions of the patient's history were reviewed and updated as appropriate: allergies, current medications and problem list.    Pertinent positives as listed in the HPI.  All other systems reviewed are negative.    Vitals:    12/27/17 0948   BP: 142/90   BP Location: Left arm   Patient Position: Sitting   Pulse: 57   SpO2: 97%   Weight: 104 kg (228 lb 6.4 oz)   Height: 177.8 cm (70\")       Physical Exam:  General: Alert and oriented to person, place, and time.  Neck: Jugular venous pressure is within normal limits. Carotids have normal upstrokes without bruits.   Cardiovascular: Regular rate and rhythm without murmur gallop or rub.  Lungs: Clear without rales or wheezes. Equal expansion is noted.   Extremities: Show no edema.  Skin: warm and dry.  Neurologic: nonfocal    Diagnostic Data:    Lab Results   Component Value Date    WBC 7.40 02/10/2017    HGB 12.7 02/10/2017    HCT 38.8 02/10/2017    MCV 79.3 (L) 02/10/2017     02/10/2017     Lab Results   Component Value Date    GLUCOSE 80 02/10/2017    BUN 16 02/10/2017    CREATININE 0.80 02/10/2017    EGFRIFNONA 80 02/10/2017    EGFRIFAFRI 97 09/15/2016    BCR 20.0 02/10/2017    K 4.4 02/10/2017    CO2 30.0 02/10/2017    CALCIUM 9.1 02/10/2017    PROTENTOTREF 6.8 09/15/2016    ALBUMIN 4.00 02/10/2017    LABIL2 1.7 02/10/2017    AST 13 02/10/2017    ALT 15 02/10/2017     Procedures    Assessment:      ICD-10-CM ICD-9-CM   1. Bilateral pulmonary embolism I26.99 415.19       Plan:    1. Start routine exercise regimen.  2. Agree with Dr. Mix regarding lifelong Xarelto due to " life-threatening bilateral pulmonary emboli.  3. Continue current medications.  4. F/up in 12 months or sooner if needed.    Scribed for Klarissa Green MD by Tunde Vaughn. 12/27/2017  10:00 AM    I Klarissa Green MD personally performed the services described in this documentation as scribed by the above individual in my presence, and it is both accurate and complete.    Klarissa Green MD, FACC

## 2018-01-22 ENCOUNTER — OFFICE VISIT (OUTPATIENT)
Dept: FAMILY MEDICINE CLINIC | Facility: CLINIC | Age: 38
End: 2018-01-22

## 2018-01-22 VITALS
TEMPERATURE: 98.2 F | WEIGHT: 226.2 LBS | RESPIRATION RATE: 20 BRPM | HEIGHT: 70 IN | BODY MASS INDEX: 32.38 KG/M2 | SYSTOLIC BLOOD PRESSURE: 144 MMHG | HEART RATE: 64 BPM | DIASTOLIC BLOOD PRESSURE: 80 MMHG

## 2018-01-22 DIAGNOSIS — H10.32 ACUTE BACTERIAL CONJUNCTIVITIS OF LEFT EYE: Primary | ICD-10-CM

## 2018-01-22 PROCEDURE — 99213 OFFICE O/P EST LOW 20 MIN: CPT | Performed by: FAMILY MEDICINE

## 2018-01-22 RX ORDER — MOXIFLOXACIN 5 MG/ML
1 SOLUTION/ DROPS OPHTHALMIC 3 TIMES DAILY
Qty: 3 ML | Refills: 0 | Status: SHIPPED | OUTPATIENT
Start: 2018-01-22 | End: 2018-01-29

## 2018-01-22 NOTE — PROGRESS NOTES
Subjective   Elisabeth Bustos is a 37 y.o. female.     Conjunctivitis    The current episode started yesterday. The onset was sudden. The problem has been rapidly worsening. The problem is moderate. Nothing relieves the symptoms. Associated symptoms include decreased vision, eye itching, congestion, eye discharge, eye pain and eye redness. Pertinent negatives include no fever, no double vision, no photophobia, no cough and no URI. The eye pain is mild. The left eye is affected. The eyelid exhibits no abnormality.        The following portions of the patient's history were reviewed and updated as appropriate: allergies, current medications, past family history, past medical history, past social history, past surgical history and problem list.    Review of Systems   Constitutional: Negative for chills and fever.   HENT: Positive for congestion.    Eyes: Positive for pain, discharge, redness, itching and visual disturbance. Negative for double vision and photophobia.   Respiratory: Negative for cough.    Cardiovascular: Negative.    Gastrointestinal: Negative.        Objective   Physical Exam   Constitutional: She is oriented to person, place, and time. She appears well-developed and well-nourished.   HENT:   Head: Normocephalic and atraumatic.   Right Ear: Hearing, tympanic membrane, external ear and ear canal normal.   Left Ear: Hearing, tympanic membrane, external ear and ear canal normal.   Nose: Nose normal.   Mouth/Throat: Uvula is midline, oropharynx is clear and moist and mucous membranes are normal.   Eyes: EOM are normal. Left eye exhibits exudate. Left conjunctiva is injected. Left conjunctiva has no hemorrhage.   Neck: Normal range of motion. Neck supple.   Cardiovascular: Normal rate, regular rhythm and normal heart sounds.    Pulmonary/Chest: Effort normal and breath sounds normal.   Lymphadenopathy:     She has no cervical adenopathy.   Neurological: She is alert and oriented to person, place, and  time.   Skin: Skin is warm and dry.   Psychiatric: Her behavior is normal.   Nursing note and vitals reviewed.      Assessment/Plan   Elisabeth was seen today for conjunctivitis.    Diagnoses and all orders for this visit:    Acute bacterial conjunctivitis of left eye  -     moxifloxacin (VIGAMOX) 0.5 % ophthalmic solution; Administer 1 drop into the left eye 3 (Three) Times a Day for 7 days.      Treatment plan above.   Encouraged her to practice good hand hygiene to prevent spread of infection.

## 2018-01-22 NOTE — PATIENT INSTRUCTIONS
Go to the nearest ER or return to clinic if symptoms worsen, fever/chill develop      Bacterial Conjunctivitis  Introduction  Bacterial conjunctivitis is an infection of your conjunctiva. This is the clear membrane that covers the white part of your eye and the inner surface of your eyelid. This condition can make your eye:  · Red or pink.  · Itchy.  This condition is caused by bacteria. This condition spreads very easily from person to person (is contagious) and from one eye to the other eye.  Follow these instructions at home:  Medicines  · Take or apply your antibiotic medicine as told by your doctor. Do not stop taking or applying the antibiotic even if you start to feel better.  · Take or apply over-the-counter and prescription medicines only as told by your doctor.  · Do not touch your eyelid with the eye drop bottle or the ointment tube.  Managing discomfort  · Wipe any fluid from your eye with a warm, wet washcloth or a cotton ball.  · Place a cool, clean washcloth on your eye. Do this for 10-20 minutes, 3-4 times per day.  General instructions  · Do not wear contact lenses until the irritation is gone. Wear glasses until your doctor says it is okay to wear contacts.  · Do not wear eye makeup until your symptoms are gone. Throw away any old makeup.  · Change or wash your pillowcase every day.  · Do not share towels or washcloths with anyone.  · Wash your hands often with soap and water. Use paper towels to dry your hands.  · Do not touch or rub your eyes.  · Do not drive or use heavy machinery if your vision is blurry.  Contact a doctor if:  · You have a fever.  · Your symptoms do not get better after 10 days.  Get help right away if:  · You have a fever and your symptoms suddenly get worse.  · You have very bad pain when you move your eye.  · Your face:  ¨ Hurts.  ¨ Is red.  ¨ Is swollen.  · You have sudden loss of vision.  This information is not intended to replace advice given to you by your health care  provider. Make sure you discuss any questions you have with your health care provider.  Document Released: 09/26/2009 Document Revised: 05/25/2017 Document Reviewed: 09/29/2016  © 2017 [employer]

## 2018-04-06 ENCOUNTER — OFFICE VISIT (OUTPATIENT)
Dept: FAMILY MEDICINE CLINIC | Facility: CLINIC | Age: 38
End: 2018-04-06

## 2018-04-06 VITALS
RESPIRATION RATE: 20 BRPM | TEMPERATURE: 98.1 F | HEART RATE: 60 BPM | DIASTOLIC BLOOD PRESSURE: 92 MMHG | WEIGHT: 216.2 LBS | SYSTOLIC BLOOD PRESSURE: 142 MMHG | BODY MASS INDEX: 30.95 KG/M2 | HEIGHT: 70 IN

## 2018-04-06 DIAGNOSIS — J45.990 EXERCISE-INDUCED ASTHMA: ICD-10-CM

## 2018-04-06 DIAGNOSIS — F33.1 MODERATE EPISODE OF RECURRENT MAJOR DEPRESSIVE DISORDER (HCC): Primary | ICD-10-CM

## 2018-04-06 PROCEDURE — 99213 OFFICE O/P EST LOW 20 MIN: CPT | Performed by: FAMILY MEDICINE

## 2018-04-06 RX ORDER — BUPROPION HYDROCHLORIDE 150 MG/1
150 TABLET ORAL DAILY
Qty: 30 TABLET | Refills: 2 | Status: SHIPPED | OUTPATIENT
Start: 2018-04-06 | End: 2018-05-17 | Stop reason: SDUPTHER

## 2018-04-06 RX ORDER — ALBUTEROL SULFATE 90 UG/1
2 AEROSOL, METERED RESPIRATORY (INHALATION) EVERY 4 HOURS PRN
Qty: 6.7 G | Refills: 11 | Status: SHIPPED | OUTPATIENT
Start: 2018-04-06 | End: 2020-02-12

## 2018-04-06 NOTE — PROGRESS NOTES
Subjective   Elisabeth Bustos is a 38 y.o. female.     She has had depression for years. Has always dealt with it through counseling and preferred to not take medication. She recently restarted counseling due to depression returning. She previously was treated with Ativan and Klonopin years ago, but no desire to resume those medication. Would like to add medication to therapy due to her counselor recommending that she does need it. Interested in Wellbutrin.         Depression   Visit Type: initial  Onset of symptoms: more than 1 year ago  Progression since onset: gradually worsening  Patient presents with the following symptoms: anhedonia, depressed mood, excessive worry and shortness of breath (during exercise).  Patient is not experiencing: palpitations, suicidal ideas and suicidal planning.  Frequency of symptoms: most days   Severity: causing significant distress   Aggravated by: family issues  Sleep quality: fair  Risk factors: no known risk factors  Patient has a history of: depression  Treatment tried: benzodiazepine  Improvement on treatment: mild         She has a history of exercise induced asthma. She has had albuterol inhaler to treat this. She has been exercising a lot recently and losing weight.     The following portions of the patient's history were reviewed and updated as appropriate: allergies, current medications, past family history, past medical history, past social history, past surgical history and problem list.    Review of Systems   Constitutional: Negative.    Respiratory: Positive for shortness of breath (during exercise). Negative for cough.    Cardiovascular: Negative for chest pain and palpitations.   Neurological: Negative.    Psychiatric/Behavioral: Positive for depressed mood. Negative for agitation, behavioral problems and suicidal ideas.       Objective   Physical Exam   Constitutional: She is oriented to person, place, and time. She appears well-developed and well-nourished.    HENT:   Head: Normocephalic and atraumatic.   Nose: Nose normal.   Eyes: Conjunctivae are normal.   Cardiovascular: Normal rate, regular rhythm and normal heart sounds.    Pulmonary/Chest: Effort normal and breath sounds normal.   Musculoskeletal: She exhibits no deformity.   Neurological: She is alert and oriented to person, place, and time.   Psychiatric: Her speech is normal and behavior is normal. Thought content normal. She exhibits a depressed mood.   Tearful during exam    Nursing note and vitals reviewed.        Assessment/Plan   Elisabeth was seen today for depression.    Diagnoses and all orders for this visit:    Moderate episode of recurrent major depressive disorder  -     buPROPion XL (WELLBUTRIN XL) 150 MG 24 hr tablet; Take 1 tablet by mouth Daily.    Exercise-induced asthma  -     albuterol (PROVENTIL HFA;VENTOLIN HFA) 108 (90 Base) MCG/ACT inhaler; Inhale 2 puffs Every 4 (Four) Hours As Needed for Wheezing.      Start Wellbutrin to treat depression. Follow up in 4 weeks. Stop treatment if depression worsens with medication and notify me. She denied suicidal/homicidal ideations. Go to the nearest ER if they occur.   Continue counseling, will help with overall treatment of depression.

## 2018-04-06 NOTE — PATIENT INSTRUCTIONS
Go to the nearest ER or return to clinic if symptoms worsen, fever/chill develop    Major Depressive Disorder, Adult  Major depressive disorder (MDD) is a mental health condition. MDD often makes you feel sad, hopeless, or helpless. MDD can also cause symptoms in your body. MDD can affect your:  · Work.  · School.  · Relationships.  · Other normal activities.  MDD can range from mild to very bad. It may occur once (single episode MDD). It can also occur many times (recurrent MDD).  The main symptoms of MDD often include:  · Feeling sad, depressed, or irritable most of the time.  · Loss of interest.  MDD symptoms also include:  · Sleeping too much or too little.  · Eating too much or too little.  · A change in your weight.  · Feeling tired (fatigue) or having low energy.  · Feeling worthless.  · Feeling guilty.  · Trouble making decisions.  · Trouble thinking clearly.  · Thoughts of suicide or harming others.  · Feeling weak.  · Feeling agitated.  · Keeping yourself from being around other people (isolation).  Follow these instructions at home:  Activity   · Do these things as told by your doctor:  ¨ Go back to your normal activities.  ¨ Exercise regularly.  ¨ Spend time outdoors.  Alcohol   · Talk with your doctor about how alcohol can affect your antidepressant medicines.  · Do not drink alcohol. Or, limit how much alcohol you drink.  ¨ This means no more than 1 drink a day for nonpregnant women and 2 drinks a day for men. One drink equals one of these:  § 12 oz of beer.  § 5 oz of wine.  § 1½ oz of hard liquor.  General instructions   · Take over-the-counter and prescription medicines only as told by your doctor.  · Eat a healthy diet.  · Get plenty of sleep.  · Find activities that you enjoy. Make time to do them.  · Think about joining a support group. Your doctor may be able to suggest a group for you.  · Keep all follow-up visits as told by your doctor. This is important.  Where to find more information:    · National [place] on Mental Illness:  ¨ www.xiomara.org  · [place] National [place] of Mental Health:  ¨ www.[facility].nih.gov  · National Suicide Prevention Lifeline:  ¨ 0-837-950-4249. This is free, 24-hour help.  Contact a doctor if:  · Your symptoms get worse.  · You have new symptoms.  Get help right away if:  · You self-harm.  · You see, hear, taste, smell, or feel things that are not present (hallucinate).  If you ever feel like you may hurt yourself or others, or have thoughts about taking your own life, get help right away. You can go to your nearest emergency department or call:  · Your local emergency services (911 in the [place]  · A suicide crisis helpline, such as the National Suicide Prevention Lifeline:  ¨ 4-887-733-3382. This is open 24 hours a day.  This information is not intended to replace advice given to you by your health care provider. Make sure you discuss any questions you have with your health care provider.  Document Released: 11/28/2016 Document Revised: 09/03/2017 Document Reviewed: 09/03/2017  Elsevier Interactive Patient Education © 2017 [employer]vier Inc.

## 2018-05-17 ENCOUNTER — OFFICE VISIT (OUTPATIENT)
Dept: FAMILY MEDICINE CLINIC | Facility: CLINIC | Age: 38
End: 2018-05-17

## 2018-05-17 VITALS
HEIGHT: 70 IN | HEART RATE: 64 BPM | DIASTOLIC BLOOD PRESSURE: 80 MMHG | BODY MASS INDEX: 28.13 KG/M2 | RESPIRATION RATE: 20 BRPM | WEIGHT: 196.5 LBS | TEMPERATURE: 97.7 F | SYSTOLIC BLOOD PRESSURE: 134 MMHG

## 2018-05-17 DIAGNOSIS — F33.1 MODERATE EPISODE OF RECURRENT MAJOR DEPRESSIVE DISORDER (HCC): Primary | ICD-10-CM

## 2018-05-17 DIAGNOSIS — F41.9 ANXIETY: ICD-10-CM

## 2018-05-17 PROCEDURE — 99214 OFFICE O/P EST MOD 30 MIN: CPT | Performed by: FAMILY MEDICINE

## 2018-05-17 RX ORDER — BUPROPION HYDROCHLORIDE 300 MG/1
300 TABLET ORAL DAILY
Qty: 30 TABLET | Refills: 5 | Status: SHIPPED | OUTPATIENT
Start: 2018-05-17 | End: 2020-02-12

## 2018-05-17 RX ORDER — BUSPIRONE HYDROCHLORIDE 7.5 MG/1
7.5 TABLET ORAL 2 TIMES DAILY
Qty: 60 TABLET | Refills: 2 | Status: SHIPPED | OUTPATIENT
Start: 2018-05-17 | End: 2020-02-12

## 2018-05-17 NOTE — PROGRESS NOTES
Subjective   Elisabeth Bustos is a 38 y.o. female.     Since starting Wellbutrin, she has noticed improvement of depression. She doesn't cry as often. However, she hasn't noticed any improvement of anxiety.    She has started exercising to help with anxiety and depression. She has been running and eating healthier. She has lost 20 lbs since April 2018.     Depression   Visit Type: follow-up  Patient presents with the following symptoms: depressed mood, excessive worry, nervousness/anxiety and panic.  Patient is not experiencing: anhedonia, chest pain, confusion, obsessions, palpitations, shortness of breath, suicidal ideas and suicidal planning.  Frequency of symptoms: occasionally   Severity: moderate   Sleep quality: good  Nighttime awakenings: none  Compliance with medications:  %        Anxiety   Presents for follow-up visit. Symptoms include depressed mood, excessive worry, nervous/anxious behavior and panic. Patient reports no chest pain, confusion, obsessions, palpitations, shortness of breath or suicidal ideas. Symptoms occur most days. The severity of symptoms is moderate and causing significant distress. The quality of sleep is good. Nighttime awakenings: none.     Her past medical history is significant for depression. Compliance with medications is %.       The following portions of the patient's history were reviewed and updated as appropriate: allergies, current medications, past family history, past medical history, past social history, past surgical history and problem list.    Review of Systems   Constitutional: Negative for chills and fever.   HENT: Negative.    Respiratory: Negative for cough and shortness of breath.    Cardiovascular: Negative for chest pain and palpitations.   Neurological: Negative for headache and confusion.   Hematological: Negative for adenopathy. Does not bruise/bleed easily.   Psychiatric/Behavioral: Positive for depressed mood. Negative for agitation and  suicidal ideas. The patient is nervous/anxious.        Objective   Physical Exam   Constitutional: She is oriented to person, place, and time. She appears well-developed and well-nourished.   HENT:   Head: Normocephalic and atraumatic.   Right Ear: External ear normal.   Left Ear: External ear normal.   Nose: Nose normal.   Eyes: Conjunctivae are normal.   Neck: Normal range of motion.   Cardiovascular: Normal rate, regular rhythm and normal heart sounds.    No murmur heard.  Pulmonary/Chest: Effort normal and breath sounds normal. She has no wheezes.   Musculoskeletal: She exhibits no edema.   Neurological: She is alert and oriented to person, place, and time.   Skin: Skin is warm and dry.   Psychiatric: Her behavior is normal. Judgment and thought content normal. Her mood appears anxious. She does not exhibit a depressed mood.   Cries easily    Nursing note and vitals reviewed.        Assessment/Plan   Elisabeth was seen today for depression.    Diagnoses and all orders for this visit:    Moderate episode of recurrent major depressive disorder  -     buPROPion XL (WELLBUTRIN XL) 300 MG 24 hr tablet; Take 1 tablet by mouth Daily.    Anxiety  -     busPIRone (BUSPAR) 7.5 MG tablet; Take 1 tablet by mouth 2 (Two) Times a Day.      Increase dose of Wellbutrin to improve depression more.   Buspar added to help with anxiety. She can take twice daily prn.   She has done great with exercise and weight loss so far, encouraged to continue.

## 2018-05-17 NOTE — PATIENT INSTRUCTIONS
Go to the nearest ER or return to clinic if symptoms worsen, fever/chill develop    Generalized Anxiety Disorder, Adult  Generalized anxiety disorder (PMAELA) is a mental health disorder. People with this condition constantly worry about everyday events. Unlike normal anxiety, worry related to PAMELA is not triggered by a specific event. These worries also do not fade or get better with time. PAMELA interferes with life functions, including relationships, work, and school.  PAMELA can vary from mild to severe. People with severe PAMELA can have intense waves of anxiety with physical symptoms (panic attacks).  What are the causes?  The exact cause of PAMELA is not known.  What increases the risk?  This condition is more likely to develop in:  · Women.  · People who have a family history of anxiety disorders.  · People who are very shy.  · People who experience very stressful life events, such as the death of a loved one.  · People who have a very stressful family environment.  What are the signs or symptoms?  People with PAMELA often worry excessively about many things in their lives, such as their health and family. They may also be overly concerned about:  · Doing well at work.  · Being on time.  · Natural disasters.  · Friendships.  Physical symptoms of PAMELA include:  · Fatigue.  · Muscle tension or having muscle twitches.  · Trembling or feeling shaky.  · Being easily startled.  · Feeling like your heart is pounding or racing.  · Feeling out of breath or like you cannot take a deep breath.  · Having trouble falling asleep or staying asleep.  · Sweating.  · Nausea, diarrhea, or irritable bowel syndrome (IBS).  · Headaches.  · Trouble concentrating or remembering facts.  · Restlessness.  · Irritability.  How is this diagnosed?  Your health care provider can diagnose PAMELA based on your symptoms and medical history. You will also have a physical exam. The health care provider will ask specific questions about your symptoms, including how  severe they are, when they started, and if they come and go. Your health care provider may ask you about your use of alcohol or drugs, including prescription medicines. Your health care provider may refer you to a mental health specialist for further evaluation.  Your health care provider will do a thorough examination and may perform additional tests to rule out other possible causes of your symptoms.  To be diagnosed with PAMELA, a person must have anxiety that:  · Is out of his or her control.  · Affects several different aspects of his or her life, such as work and relationships.  · Causes distress that makes him or her unable to take part in normal activities.  · Includes at least three physical symptoms of PAMELA, such as restlessness, fatigue, trouble concentrating, irritability, muscle tension, or sleep problems.  Before your health care provider can confirm a diagnosis of PAMELA, these symptoms must be present more days than they are not, and they must last for six months or longer.  How is this treated?  The following therapies are usually used to treat PAMELA:  · Medicine. Antidepressant medicine is usually prescribed for long-term daily control. Antianxiety medicines may be added in severe cases, especially when panic attacks occur.  · Talk therapy (psychotherapy). Certain types of talk therapy can be helpful in treating PAMELA by providing support, education, and guidance. Options include:  ¨ Cognitive behavioral therapy (CBT). People learn coping skills and techniques to ease their anxiety. They learn to identify unrealistic or negative thoughts and behaviors and to replace them with positive ones.  ¨ Acceptance and commitment therapy (ACT). This treatment teaches people how to be mindful as a way to cope with unwanted thoughts and feelings.  ¨ Biofeedback. This process trains you to manage your body's response (physiological response) through breathing techniques and relaxation methods. You will work with a  therapist while machines are used to monitor your physical symptoms.  · Stress management techniques. These include yoga, meditation, and exercise.  A mental health specialist can help determine which treatment is best for you. Some people see improvement with one type of therapy. However, other people require a combination of therapies.  Follow these instructions at home:  · Take over-the-counter and prescription medicines only as told by your health care provider.  · Try to maintain a normal routine.  · Try to anticipate stressful situations and allow extra time to manage them.  · Practice any stress management or self-calming techniques as taught by your health care provider.  · Do not punish yourself for setbacks or for not making progress.  · Try to recognize your accomplishments, even if they are small.  · Keep all follow-up visits as told by your health care provider. This is important.  Contact a health care provider if:  · Your symptoms do not get better.  · Your symptoms get worse.  · You have signs of depression, such as:  ¨ A persistently sad, cranky, or irritable mood.  ¨ Loss of enjoyment in activities that used to bring you matthew.  ¨ Change in weight or eating.  ¨ Changes in sleeping habits.  ¨ Avoiding friends or family members.  ¨ Loss of energy for normal tasks.  ¨ Feelings of guilt or worthlessness.  Get help right away if:  · You have serious thoughts about hurting yourself or others.  If you ever feel like you may hurt yourself or others, or have thoughts about taking your own life, get help right away. You can go to your nearest emergency department or call:  · Your local emergency services (911 in the [place]  · A suicide crisis helpline, such as the National Suicide Prevention Lifeline at 1-886.286.6078. This is open 24 hours a day.  Summary  · Generalized anxiety disorder (PAMELA) is a mental health disorder that involves worry that is not triggered by a specific event.  · People with PAMELA often  worry excessively about many things in their lives, such as their health and family.  · PAMELA may cause physical symptoms such as restlessness, trouble concentrating, sleep problems, frequent sweating, nausea, diarrhea, headaches, and trembling or muscle twitching.  · A mental health specialist can help determine which treatment is best for you. Some people see improvement with one type of therapy. However, other people require a combination of therapies.  This information is not intended to replace advice given to you by your health care provider. Make sure you discuss any questions you have with your health care provider.  Document Released: 04/14/2014 Document Revised: 11/07/2017 Document Reviewed: 11/07/2017  ElseDP[employer] Digital Interactive Patient Education © 2017 Elsevier Inc.

## 2018-06-19 DIAGNOSIS — H10.32 ACUTE BACTERIAL CONJUNCTIVITIS OF LEFT EYE: ICD-10-CM

## 2018-06-19 RX ORDER — MOXIFLOXACIN 5 MG/ML
SOLUTION/ DROPS OPHTHALMIC
Qty: 3 ML | Refills: 0 | OUTPATIENT
Start: 2018-06-19

## 2020-02-12 ENCOUNTER — OFFICE VISIT (OUTPATIENT)
Dept: FAMILY MEDICINE CLINIC | Facility: CLINIC | Age: 40
End: 2020-02-12

## 2020-02-12 VITALS
HEIGHT: 70 IN | BODY MASS INDEX: 31.07 KG/M2 | DIASTOLIC BLOOD PRESSURE: 86 MMHG | SYSTOLIC BLOOD PRESSURE: 124 MMHG | HEART RATE: 72 BPM | TEMPERATURE: 97.7 F | WEIGHT: 217 LBS

## 2020-02-12 DIAGNOSIS — F33.1 MODERATE EPISODE OF RECURRENT MAJOR DEPRESSIVE DISORDER (HCC): Primary | ICD-10-CM

## 2020-02-12 PROCEDURE — 99213 OFFICE O/P EST LOW 20 MIN: CPT | Performed by: FAMILY MEDICINE

## 2020-02-12 RX ORDER — BUPROPION HYDROCHLORIDE 150 MG/1
150 TABLET ORAL EVERY MORNING
Qty: 90 TABLET | Refills: 1 | Status: SHIPPED | OUTPATIENT
Start: 2020-02-12 | End: 2020-03-10 | Stop reason: SDUPTHER

## 2020-02-12 NOTE — PATIENT INSTRUCTIONS
Go to the nearest ER or return to clinic if symptoms worsen, fever/chill develop    Major Depressive Disorder, Adult  Major depressive disorder (MDD) is a mental health condition. MDD often makes you feel sad, hopeless, or helpless. MDD can also cause symptoms in your body. MDD can affect your:  · Work.  · School.  · Relationships.  · Other normal activities.  MDD can range from mild to very bad. It may occur once (single episode MDD). It can also occur many times (recurrent MDD).  The main symptoms of MDD often include:  · Feeling sad, depressed, or irritable most of the time.  · Loss of interest.  MDD symptoms also include:  · Sleeping too much or too little.  · Eating too much or too little.  · A change in your weight.  · Feeling tired (fatigue) or having low energy.  · Feeling worthless.  · Feeling guilty.  · Trouble making decisions.  · Trouble thinking clearly.  · Thoughts of suicide or harming others.  · Feeling weak.  · Feeling agitated.  · Keeping yourself from being around other people (isolation).  Follow these instructions at home:  Activity  · Do these things as told by your doctor:  ? Go back to your normal activities.  ? Exercise regularly.  ? Spend time outdoors.  Alcohol  · Talk with your doctor about how alcohol can affect your antidepressant medicines.  · Do not drink alcohol. Or, limit how much alcohol you drink.  ? This means no more than 1 drink a day for nonpregnant women and 2 drinks a day for men. One drink equals one of these:  § 12 oz of beer.  § 5 oz of wine.  § 1½ oz of hard liquor.  General instructions  · Take over-the-counter and prescription medicines only as told by your doctor.  · Eat a healthy diet.  · Get plenty of sleep.  · Find activities that you enjoy. Make time to do them.  · Think about joining a support group. Your doctor may be able to suggest a group for you.  · Keep all follow-up visits as told by your doctor. This is important.  Where to find more  information:  · National [place] on Mental Illness:  ? www.xiomara.org  · [place] National [place] of Mental Health:  ? www.[facility].nih.gov  · National Suicide Prevention Lifeline:  ? 9-553-468-1040. This is free, 24-hour help.  Contact a doctor if:  · Your symptoms get worse.  · You have new symptoms.  Get help right away if:  · You self-harm.  · You see, hear, taste, smell, or feel things that are not present (hallucinate).  If you ever feel like you may hurt yourself or others, or have thoughts about taking your own life, get help right away. You can go to your nearest emergency department or call:  · Your local emergency services (911 in the [place]  · A suicide crisis helpline, such as the National Suicide Prevention Lifeline:  ? 2-507-881-0404. This is open 24 hours a day.  This information is not intended to replace advice given to you by your health care provider. Make sure you discuss any questions you have with your health care provider.  Document Released: 11/28/2016 Document Revised: 09/03/2017 Document Reviewed: 09/03/2017  Elsevier Interactive Patient Education © 2019 Elsevier Inc.

## 2020-02-12 NOTE — PROGRESS NOTES
Subjective   Elisabeth Bustos is a 40 y.o. female.   Chief Complaint   Patient presents with   • Discuss meds     wants back on Wellburtin (self pay)      Depression   Visit Type: follow-up  Patient presents with the following symptoms: depressed mood and weight gain.  Patient is not experiencing: confusion, hypersomnia, insomnia, nervousness/anxiety, palpitations, shortness of breath, suicidal ideas and suicidal planning.  Frequency of symptoms: most days   Severity: causing significant distress   Sleep quality: fair  Nighttime awakenings: occasional    Depression has been worsening, no significant life changes to make mood worse.  She states that this does come and go.  She has been without bupropion for months.  She did respond well to this previously, would like to resume.    The following portions of the patient's history were reviewed and updated as appropriate: allergies, current medications, past family history, past medical history, past social history, past surgical history and problem list.    Review of Systems   Constitutional: Positive for unexpected weight gain. Negative for chills, fatigue and fever.   HENT: Negative.    Eyes: Negative.    Respiratory: Negative for cough, chest tightness and shortness of breath.    Cardiovascular: Negative for chest pain and palpitations.   Skin: Negative for rash.   Neurological: Negative for light-headedness, headache and confusion.   Hematological: Negative for adenopathy. Does not bruise/bleed easily.   Psychiatric/Behavioral: Positive for depressed mood. Negative for suicidal ideas. The patient is not nervous/anxious and does not have insomnia.        Objective   Physical Exam   Constitutional: She appears well-developed and well-nourished.   HENT:   Head: Normocephalic and atraumatic.   Right Ear: External ear normal.   Left Ear: External ear normal.   Nose: Nose normal.   Eyes: Conjunctivae are normal.   Cardiovascular: Normal rate, regular rhythm and  normal heart sounds.   No murmur heard.  Pulmonary/Chest: Effort normal and breath sounds normal. She has no wheezes.   Neurological: She is alert.   Skin: Skin is warm and dry.   Psychiatric: She exhibits a depressed mood.   Tearful during encounter   Nursing note and vitals reviewed.        Assessment/Plan   Elisabeth was seen today for discuss meds.    Diagnoses and all orders for this visit:    Moderate episode of recurrent major depressive disorder (CMS/HCC)  -     buPROPion XL (WELLBUTRIN XL) 150 MG 24 hr tablet; Take 1 tablet by mouth Every Morning.      Will resume bupropion  mg daily.  She was previously on 300 mg.  She will contact me after 1 month of therapy if she feels that dose does need to be increased.  She denied suicidal/homicidal ideations. Go to the nearest ER if they occur.

## 2020-03-10 ENCOUNTER — TELEPHONE (OUTPATIENT)
Dept: FAMILY MEDICINE CLINIC | Facility: CLINIC | Age: 40
End: 2020-03-10

## 2020-03-10 DIAGNOSIS — F33.1 MODERATE EPISODE OF RECURRENT MAJOR DEPRESSIVE DISORDER (HCC): ICD-10-CM

## 2020-03-10 RX ORDER — BUPROPION HYDROCHLORIDE 300 MG/1
300 TABLET ORAL EVERY MORNING
Qty: 90 TABLET | Refills: 1 | Status: SHIPPED | OUTPATIENT
Start: 2020-03-10 | End: 2020-09-24

## 2020-03-10 NOTE — TELEPHONE ENCOUNTER
PT CALLED TO LET THE DR KNOW THAT SHE WOULD LIKE TO HAVE HER CURRENT MED buPROPion XL (WELLBUTRIN XL) 150 MG 24 hr tablet INCREASED  MG.    PT CONTACT  [phone]     PLEASE ADVISE

## 2020-04-20 ENCOUNTER — TELEMEDICINE (OUTPATIENT)
Dept: FAMILY MEDICINE CLINIC | Facility: CLINIC | Age: 40
End: 2020-04-20

## 2020-04-20 DIAGNOSIS — M72.2 PLANTAR FASCIITIS, LEFT: Primary | ICD-10-CM

## 2020-04-20 PROCEDURE — 99213 OFFICE O/P EST LOW 20 MIN: CPT | Performed by: FAMILY MEDICINE

## 2020-04-20 RX ORDER — PREDNISONE 10 MG/1
TABLET ORAL
Qty: 21 TABLET | Refills: 0 | Status: SHIPPED | OUTPATIENT
Start: 2020-04-20 | End: 2020-04-29 | Stop reason: ALTCHOICE

## 2020-04-20 NOTE — PROGRESS NOTES
Subjective   Eliasbeth Bustos is a 40 y.o. female.   Chief Complaint   Patient presents with   • Foot Pain   You have chosen to receive care through a telehealth visit.  Do you consent to use a video/audio connection for your medical care today? Yes    Foot Injury    There was no injury mechanism. The pain is present in the left foot. The quality of the pain is described as burning and aching. The pain is at a severity of 4/10. The pain is moderate. The pain has been fluctuating since onset. Pertinent negatives include no inability to bear weight, numbness or tingling. The symptoms are aggravated by movement. She has tried NSAIDs and heat (epsom salt soaks) for the symptoms. The treatment provided mild relief.     Thinks that foot pain is related to plantar fasciitis. She has started wearing arch supports, supportive shoes, massaging her foot with minimal relief. Symptoms most significant in the AM, first few steps.      The following portions of the patient's history were reviewed and updated as appropriate: allergies, current medications, past family history, past medical history, past social history, past surgical history and problem list.    Review of Systems   Constitutional: Negative.    Respiratory: Negative.    Cardiovascular: Negative.    Musculoskeletal: Positive for arthralgias and gait problem. Negative for joint swelling.   Neurological: Negative for tingling and numbness.       Objective   Physical Exam   Constitutional: She is oriented to person, place, and time. She appears well-nourished. No distress.   HENT:   Head: Normocephalic and atraumatic.   Nose: Nose normal.   Eyes: Conjunctivae are normal.   Pulmonary/Chest: Effort normal.   Musculoskeletal: She exhibits no deformity.   Neurological: She is alert and oriented to person, place, and time.   Psychiatric: She has a normal mood and affect. Her behavior is normal.     Unable to complete full physical exam due to video  encounter    Assessment/Plan   Elisabeth was seen today for foot pain.    Diagnoses and all orders for this visit:    Plantar fasciitis, left  -     predniSONE (DELTASONE) 10 MG tablet; Take 60 mg po day 1, 50 mg day 2, 40 mg day 3, 30 mg day 4, 20 mg day 5, 10 mg day 6  -     Plantar Fascia Night Splint      Will treat with steroid taper and night splints. If symptoms persist, consider podiatry referral.   Continue arch supports, foot massage, apply ice to the arch.     Time spent during encounter: 15 minutes.

## 2020-04-29 DIAGNOSIS — M72.2 PLANTAR FASCIITIS, LEFT: Primary | ICD-10-CM

## 2020-04-29 RX ORDER — METHYLPREDNISOLONE 4 MG/1
TABLET ORAL
Qty: 21 EACH | Refills: 0 | Status: SHIPPED | OUTPATIENT
Start: 2020-04-29 | End: 2020-04-30 | Stop reason: SDUPTHER

## 2020-04-30 ENCOUNTER — TELEPHONE (OUTPATIENT)
Dept: FAMILY MEDICINE CLINIC | Facility: CLINIC | Age: 40
End: 2020-04-30

## 2020-04-30 DIAGNOSIS — M72.2 PLANTAR FASCIITIS, LEFT: ICD-10-CM

## 2020-04-30 RX ORDER — METHYLPREDNISOLONE 4 MG/1
TABLET ORAL
Qty: 21 EACH | Refills: 0 | Status: SHIPPED | OUTPATIENT
Start: 2020-04-30 | End: 2020-09-24

## 2020-04-30 NOTE — TELEPHONE ENCOUNTER
Patient calling requesting methylPREDNISolone (MEDROL, NANCI,)     Be sent to [facility] in [place]

## 2020-04-30 NOTE — TELEPHONE ENCOUNTER
Rx yesterday was sent to [facility] in [place]. I have corrected and resent to [facility] in [facility].

## 2020-05-19 ENCOUNTER — TELEPHONE (OUTPATIENT)
Dept: FAMILY MEDICINE CLINIC | Facility: CLINIC | Age: 40
End: 2020-05-19

## 2020-05-19 DIAGNOSIS — M72.2 PLANTAR FASCIITIS, LEFT: Primary | ICD-10-CM

## 2020-05-19 NOTE — TELEPHONE ENCOUNTER
Patient called and stated that she is being treated for Plantar Faciitis.  Patient stated that Dr. Vega told her that if her treatment doesn't help in a couple of weeks she would be referred to a podiatrist.      Patient callback:  [phone]     Please advise.

## 2020-09-24 ENCOUNTER — TELEPHONE (OUTPATIENT)
Dept: FAMILY MEDICINE CLINIC | Facility: CLINIC | Age: 40
End: 2020-09-24

## 2020-09-24 ENCOUNTER — OFFICE VISIT (OUTPATIENT)
Dept: FAMILY MEDICINE CLINIC | Facility: CLINIC | Age: 40
End: 2020-09-24

## 2020-09-24 VITALS
WEIGHT: 216 LBS | HEIGHT: 70 IN | BODY MASS INDEX: 30.92 KG/M2 | RESPIRATION RATE: 16 BRPM | HEART RATE: 56 BPM | OXYGEN SATURATION: 99 % | SYSTOLIC BLOOD PRESSURE: 128 MMHG | DIASTOLIC BLOOD PRESSURE: 84 MMHG | TEMPERATURE: 98.7 F

## 2020-09-24 DIAGNOSIS — F98.8 ATTENTION DEFICIT DISORDER (ADD) WITHOUT HYPERACTIVITY: Primary | ICD-10-CM

## 2020-09-24 PROCEDURE — 99214 OFFICE O/P EST MOD 30 MIN: CPT | Performed by: FAMILY MEDICINE

## 2020-09-24 RX ORDER — DEXTROAMPHETAMINE SACCHARATE, AMPHETAMINE ASPARTATE MONOHYDRATE, DEXTROAMPHETAMINE SULFATE AND AMPHETAMINE SULFATE 5; 5; 5; 5 MG/1; MG/1; MG/1; MG/1
20 CAPSULE, EXTENDED RELEASE ORAL EVERY MORNING
Qty: 30 CAPSULE | Refills: 0 | Status: SHIPPED | OUTPATIENT
Start: 2020-09-24 | End: 2020-10-22 | Stop reason: SDUPTHER

## 2020-09-24 RX ORDER — DEXTROAMPHETAMINE SACCHARATE, AMPHETAMINE ASPARTATE MONOHYDRATE, DEXTROAMPHETAMINE SULFATE AND AMPHETAMINE SULFATE 5; 5; 5; 5 MG/1; MG/1; MG/1; MG/1
20 CAPSULE, EXTENDED RELEASE ORAL EVERY MORNING
Qty: 30 CAPSULE | Refills: 0 | Status: SHIPPED | OUTPATIENT
Start: 2020-09-24 | End: 2020-09-24 | Stop reason: SDUPTHER

## 2020-09-24 NOTE — TELEPHONE ENCOUNTER
PATIENT CALLED AND STATED THAT THE MEDICATION YOU PRESCRIBED IS NOT COVERED BY HER INSURANCE WITHOUT PRIOR AUTH.  SHE'S FINE WITH THAT BUT IF SHE HAS TO PAY OUT OF POCKET, SHE CAN GET IT FOR HALF THE PRICE BY SENDING IT TO [facility] WITH [facility].    [id number] - [facility] [address] [facility] [address] - [phone]  - [phone] FX     PLEASE CONTACT PATIENT TO ADVISE.    CALLBACK:  [phone]

## 2020-09-24 NOTE — PATIENT INSTRUCTIONS
Go to the nearest ER or return to clinic if symptoms worsen, fever/chill develop  Amphetamine; Dextroamphetamine tablets  What is this medicine?  AMPHETAMINE; DEXTROAMPHETAMINE(am FET a meen; dex troe am FET a meen) is used to treat attention-deficit hyperactivity disorder (ADHD). It may also be used for narcolepsy. Federal law prohibits giving this medicine to any person other than the person for whom it was prescribed. Do not share this medicine with anyone else.  This medicine may be used for other purposes; ask your health care provider or pharmacist if you have questions.  COMMON BRAND NAME(S): Adderall  What should I tell my health care provider before I take this medicine?  They need to know if you have any of these conditions:  · anxiety or panic attacks  · circulation problems in fingers and toes  · glaucoma  · hardening or blockages of the arteries or heart blood vessels  · heart disease or a heart defect  · high blood pressure  · history of a drug or alcohol abuse problem  · history of stroke  · kidney disease  · liver disease  · mental illness  · seizures  · suicidal thoughts, plans, or attempt; a previous suicide attempt by you or a family member  · thyroid disease  · Tourette's syndrome  · an unusual or allergic reaction to dextroamphetamine, other amphetamines, other medicines, foods, dyes, or preservatives  · pregnant or trying to get pregnant  · breast-feeding  How should I use this medicine?  Take this medicine by mouth with a glass of water. Follow the directions on the prescription label. Take your doses at regular intervals. Do not take your medicine more often than directed. Do not suddenly stop your medicine. You must gradually reduce the dose or you may feel withdrawal effects. Ask your doctor or health care professional for advice.  Talk to your pediatrician regarding the use of this medicine in children. Special care may be needed. While this drug may be prescribed for children as young as  3 years for selected conditions, precautions do apply.  Overdosage: If you think you have taken too much of this medicine contact a poison control center or emergency room at once.  NOTE: This medicine is only for you. Do not share this medicine with others.  What if I miss a dose?  If you miss a dose, take it as soon as you can. If it is almost time for your next dose, take only that dose. Do not take double or extra doses.  What may interact with this medicine?  Do not take this medicine with any of the following medications:  · MAOIs like Carbex, Eldepryl, Marplan, Nardil, and Parnate  · other stimulant medicines for attention disorders  This medicine may also interact with the following medications:  · acetazolamide  · ammonium chloride  · antacids  · ascorbic acid  · atomoxetine  · caffeine  · certain medicines for blood pressure  · certain medicines for depression, anxiety, or psychotic disturbances  · certain medicines for seizures like carbamazepine, phenobarbital, phenytoin  · certain medicines for stomach problems like cimetidine, ranitidine, famotidine, esomeprazole, omeprazole, lansoprazole, pantoprazole  · lithium  · medicines for colds and breathing difficulties  · medicines for diabetes  · medicines or dietary supplements for weight loss or to stay awake  · methenamine  · narcotic medicines for pain  · quinidine  · ritonavir  · sodium bicarbonate  · Kelly's wort  This list may not describe all possible interactions. Give your health care provider a list of all the medicines, herbs, non-prescription drugs, or dietary supplements you use. Also tell them if you smoke, drink alcohol, or use illegal drugs. Some items may interact with your medicine.  What should I watch for while using this medicine?  Visit your doctor or health care professional for regular checks on your progress. This prescription requires that you follow special procedures with your doctor and pharmacy. You will need to have a new  written prescription from your doctor every time you need a refill.  This medicine may affect your concentration, or hide signs of tiredness. Until you know how this medicine affects you, do not drive, ride a bicycle, use machinery, or do anything that needs mental alertness.  Tell your doctor or health care professional if this medicine loses its effects, or if you feel you need to take more than the prescribed amount. Do not change the dosage without talking to your doctor or health care professional.  Decreased appetite is a common side effect when starting this medicine. Eating small, frequent meals or snacks can help. Talk to your doctor if you continue to have poor eating habits. Height and weight growth of a child taking this medicine will be monitored closely.  Do not take this medicine close to bedtime. It may prevent you from sleeping.  If you are going to need surgery, a MRI, CT scan, or other procedure, tell your doctor that you are taking this medicine. You may need to stop taking this medicine before the procedure.  Tell your doctor or healthcare professional right away if you notice unexplained wounds on your fingers and toes while taking this medicine. You should also tell your healthcare provider if you experience numbness or pain, changes in the skin color, or sensitivity to temperature in your fingers or toes.  What side effects may I notice from receiving this medicine?  Side effects that you should report to your doctor or health care professional as soon as possible:  · allergic reactions like skin rash, itching or hives, swelling of the face, lips, or tongue  · anxious  · breathing problems  · changes in emotions or moods  · changes in vision  · chest pain or chest tightness  · fast, irregular heartbeat  · fingers or toes feel numb, cool, painful  · hallucination, loss of contact with reality  · high blood pressure  · males: prolonged or painful erection  · seizures  · signs and symptoms of  serotonin syndrome like confusion, increased sweating, fever, tremor, stiff muscles, diarrhea  · signs and symptoms of a stroke like changes in vision; confusion; trouble speaking or understanding; severe headaches; sudden numbness or weakness of the face, arm or leg; trouble walking; dizziness; loss of balance or coordination  · suicidal thoughts or other mood changes  · uncontrollable head, mouth, neck, arm, or leg movements  Side effects that usually do not require medical attention (report to your doctor or health care professional if they continue or are bothersome):  · dry mouth  · headache  · irritability  · loss of appetite  · nausea  · trouble sleeping  · weight loss  This list may not describe all possible side effects. Call your doctor for medical advice about side effects. You may report side effects to FDA at 7-319-QGU-3469.  Where should I keep my medicine?  Keep out of the reach of children. This medicine can be abused. Keep your medicine in a safe place to protect it from theft. Do not share this medicine with anyone. Selling or giving away this medicine is dangerous and against the law.  Store at room temperature between 15 and 30 degrees C (59 and 86 degrees F). Keep container tightly closed. Throw away any unused medicine after the expiration date. Dispose of properly. This medicine may cause accidental overdose and death if it is taken by other adults, children, or pets. Mix any unused medicine with a substance like cat litter or coffee grounds. Then throw the medicine away in a sealed container like a sealed bag or a coffee can with a lid. Do not use the medicine after the expiration date.  NOTE: This sheet is a summary. It may not cover all possible information. If you have questions about this medicine, talk to your doctor, pharmacist, or health care provider.  © 2020 [employer] Standard (2018-02-09 16:28:23)

## 2020-09-24 NOTE — PROGRESS NOTES
Subjective   Elisabeth Bustos is a 40 y.o. female.   Chief Complaint   Patient presents with   • 'ADD out of control'     History of Present Illness   She has bouts of depression. Cycles on and off from medication to help with mood.   No longer taking Wellbutrin, tapered herself off.   Today, she is concerned about lack of concentration.   While in school and young adult, diagnosed with ADD. No hyperactivity component.   Now having difficulty with concentration, which seems to be worsening. She has always dealt with this at home, but now it is affecting her professional career.   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 Unable to remember if she has sanitized areas after clients have been there, forgets if she gets new clean capes for them. Unable to remember her passwords.   Has always been hard to stay on tasks at home. Will start multiple chores at once, but complete none. This didn't bother her because it wasn't affecting her job, but now it is and she is getting embarrassed.   Years ago, she discussed starting Adderall with a provider, but declined to start at that time. She thinks that she needs to start treatment due to if affecting life at both home and work.   No chest pain, palpitations, sleep disturbance.     The following portions of the patient's history were reviewed and updated as appropriate: allergies, current medications, past family history, past medical history, past social history, past surgical history and problem list.    Review of Systems   Constitutional: Negative for activity change and appetite change.   Eyes: Negative.    Respiratory: Negative for cough and shortness of breath.    Cardiovascular: Negative for chest pain and palpitations.   Gastrointestinal: Negative.    Neurological: Negative for light-headedness and headache.   Psychiatric/Behavioral: Positive for decreased concentration. Negative for sleep disturbance. The patient is not nervous/anxious.         Objective   Physical Exam  Vitals signs and nursing note reviewed.   Constitutional:       Appearance: Normal appearance. She is well-developed.   HENT:      Head: Normocephalic and atraumatic.      Right Ear: External ear normal.      Left Ear: External ear normal.      Nose: Nose normal.   Eyes:      Conjunctiva/sclera: Conjunctivae normal.   Neck:      Musculoskeletal: Normal range of motion and neck supple.   Cardiovascular:      Rate and Rhythm: Normal rate and regular rhythm.      Heart sounds: Normal heart sounds. No murmur.   Pulmonary:      Effort: Pulmonary effort is normal. No respiratory distress.      Breath sounds: Normal breath sounds. No wheezing.   Musculoskeletal:         General: No deformity.   Skin:     General: Skin is warm and dry.   Neurological:      General: No focal deficit present.      Mental Status: She is alert and oriented to person, place, and time.   Psychiatric:         Mood and Affect: Mood normal.         Speech: Speech normal.         Behavior: Behavior normal. Behavior is not agitated or hyperactive.         Thought Content: Thought content normal.         Judgment: Judgment normal.           Assessment/Plan   Elisabeth was seen today for 'add out of control'.    Diagnoses and all orders for this visit:    Attention deficit disorder (ADD) without hyperactivity  -     amphetamine-dextroamphetamine XR (Adderall XR) 20 MG 24 hr capsule; Take 1 capsule by mouth Every Morning      Start Adderall to treat ADD.  Follow-up in 1 month to determine if treatment is effective for any dose adjustment as needed.  Ceasar report reviewed.  If any side effects develop after starting this medication, she has been advised to stop treatment and seek medical attention.

## 2020-10-22 ENCOUNTER — OFFICE VISIT (OUTPATIENT)
Dept: FAMILY MEDICINE CLINIC | Facility: CLINIC | Age: 40
End: 2020-10-22

## 2020-10-22 VITALS
BODY MASS INDEX: 30.35 KG/M2 | HEART RATE: 62 BPM | DIASTOLIC BLOOD PRESSURE: 84 MMHG | WEIGHT: 212 LBS | SYSTOLIC BLOOD PRESSURE: 122 MMHG | RESPIRATION RATE: 16 BRPM | OXYGEN SATURATION: 99 % | TEMPERATURE: 97.7 F | HEIGHT: 70 IN

## 2020-10-22 DIAGNOSIS — F98.8 ATTENTION DEFICIT DISORDER (ADD) WITHOUT HYPERACTIVITY: ICD-10-CM

## 2020-10-22 PROCEDURE — 99213 OFFICE O/P EST LOW 20 MIN: CPT | Performed by: FAMILY MEDICINE

## 2020-10-22 RX ORDER — DEXTROAMPHETAMINE SACCHARATE, AMPHETAMINE ASPARTATE MONOHYDRATE, DEXTROAMPHETAMINE SULFATE AND AMPHETAMINE SULFATE 7.5; 7.5; 7.5; 7.5 MG/1; MG/1; MG/1; MG/1
30 CAPSULE, EXTENDED RELEASE ORAL EVERY MORNING
Qty: 30 CAPSULE | Refills: 0 | Status: SHIPPED | OUTPATIENT
Start: 2020-10-22 | End: 2020-11-23 | Stop reason: SDUPTHER

## 2020-10-22 NOTE — PATIENT INSTRUCTIONS
Go to the nearest ER or return to clinic if symptoms worsen, fever/chill develop    Amphetamine; Dextroamphetamine tablets  What is this medicine?  AMPHETAMINE; DEXTROAMPHETAMINE(am FET a meen; dex troe am FET a meen) is used to treat attention-deficit hyperactivity disorder (ADHD). It may also be used for narcolepsy. Federal law prohibits giving this medicine to any person other than the person for whom it was prescribed. Do not share this medicine with anyone else.  This medicine may be used for other purposes; ask your health care provider or pharmacist if you have questions.  COMMON BRAND NAME(S): Adderall  What should I tell my health care provider before I take this medicine?  They need to know if you have any of these conditions:  · anxiety or panic attacks  · circulation problems in fingers and toes  · glaucoma  · hardening or blockages of the arteries or heart blood vessels  · heart disease or a heart defect  · high blood pressure  · history of a drug or alcohol abuse problem  · history of stroke  · kidney disease  · liver disease  · mental illness  · seizures  · suicidal thoughts, plans, or attempt; a previous suicide attempt by you or a family member  · thyroid disease  · Tourette's syndrome  · an unusual or allergic reaction to dextroamphetamine, other amphetamines, other medicines, foods, dyes, or preservatives  · pregnant or trying to get pregnant  · breast-feeding  How should I use this medicine?  Take this medicine by mouth with a glass of water. Follow the directions on the prescription label. Take your doses at regular intervals. Do not take your medicine more often than directed. Do not suddenly stop your medicine. You must gradually reduce the dose or you may feel withdrawal effects. Ask your doctor or health care professional for advice.  Talk to your pediatrician regarding the use of this medicine in children. Special care may be needed. While this drug may be prescribed for children as young  as 3 years for selected conditions, precautions do apply.  Overdosage: If you think you have taken too much of this medicine contact a poison control center or emergency room at once.  NOTE: This medicine is only for you. Do not share this medicine with others.  What if I miss a dose?  If you miss a dose, take it as soon as you can. If it is almost time for your next dose, take only that dose. Do not take double or extra doses.  What may interact with this medicine?  Do not take this medicine with any of the following medications:  · MAOIs like Carbex, Eldepryl, Marplan, Nardil, and Parnate  · other stimulant medicines for attention disorders  This medicine may also interact with the following medications:  · acetazolamide  · ammonium chloride  · antacids  · ascorbic acid  · atomoxetine  · caffeine  · certain medicines for blood pressure  · certain medicines for depression, anxiety, or psychotic disturbances  · certain medicines for seizures like carbamazepine, phenobarbital, phenytoin  · certain medicines for stomach problems like cimetidine, ranitidine, famotidine, esomeprazole, omeprazole, lansoprazole, pantoprazole  · lithium  · medicines for colds and breathing difficulties  · medicines for diabetes  · medicines or dietary supplements for weight loss or to stay awake  · methenamine  · narcotic medicines for pain  · quinidine  · ritonavir  · sodium bicarbonate  · Mackay's wort  This list may not describe all possible interactions. Give your health care provider a list of all the medicines, herbs, non-prescription drugs, or dietary supplements you use. Also tell them if you smoke, drink alcohol, or use illegal drugs. Some items may interact with your medicine.  What should I watch for while using this medicine?  Visit your doctor or health care professional for regular checks on your progress. This prescription requires that you follow special procedures with your doctor and pharmacy. You will need to have a  new written prescription from your doctor every time you need a refill.  This medicine may affect your concentration, or hide signs of tiredness. Until you know how this medicine affects you, do not drive, ride a bicycle, use machinery, or do anything that needs mental alertness.  Tell your doctor or health care professional if this medicine loses its effects, or if you feel you need to take more than the prescribed amount. Do not change the dosage without talking to your doctor or health care professional.  Decreased appetite is a common side effect when starting this medicine. Eating small, frequent meals or snacks can help. Talk to your doctor if you continue to have poor eating habits. Height and weight growth of a child taking this medicine will be monitored closely.  Do not take this medicine close to bedtime. It may prevent you from sleeping.  If you are going to need surgery, a MRI, CT scan, or other procedure, tell your doctor that you are taking this medicine. You may need to stop taking this medicine before the procedure.  Tell your doctor or healthcare professional right away if you notice unexplained wounds on your fingers and toes while taking this medicine. You should also tell your healthcare provider if you experience numbness or pain, changes in the skin color, or sensitivity to temperature in your fingers or toes.  What side effects may I notice from receiving this medicine?  Side effects that you should report to your doctor or health care professional as soon as possible:  · allergic reactions like skin rash, itching or hives, swelling of the face, lips, or tongue  · anxious  · breathing problems  · changes in emotions or moods  · changes in vision  · chest pain or chest tightness  · fast, irregular heartbeat  · fingers or toes feel numb, cool, painful  · hallucination, loss of contact with reality  · high blood pressure  · males: prolonged or painful erection  · seizures  · signs and symptoms  of serotonin syndrome like confusion, increased sweating, fever, tremor, stiff muscles, diarrhea  · signs and symptoms of a stroke like changes in vision; confusion; trouble speaking or understanding; severe headaches; sudden numbness or weakness of the face, arm or leg; trouble walking; dizziness; loss of balance or coordination  · suicidal thoughts or other mood changes  · uncontrollable head, mouth, neck, arm, or leg movements  Side effects that usually do not require medical attention (report to your doctor or health care professional if they continue or are bothersome):  · dry mouth  · headache  · irritability  · loss of appetite  · nausea  · trouble sleeping  · weight loss  This list may not describe all possible side effects. Call your doctor for medical advice about side effects. You may report side effects to FDA at 1-343-WFX-8459.  Where should I keep my medicine?  Keep out of the reach of children. This medicine can be abused. Keep your medicine in a safe place to protect it from theft. Do not share this medicine with anyone. Selling or giving away this medicine is dangerous and against the law.  Store at room temperature between 15 and 30 degrees C (59 and 86 degrees F). Keep container tightly closed. Throw away any unused medicine after the expiration date. Dispose of properly. This medicine may cause accidental overdose and death if it is taken by other adults, children, or pets. Mix any unused medicine with a substance like cat litter or coffee grounds. Then throw the medicine away in a sealed container like a sealed bag or a coffee can with a lid. Do not use the medicine after the expiration date.  NOTE: This sheet is a summary. It may not cover all possible information. If you have questions about this medicine, talk to your doctor, pharmacist, or health care provider.  © 2020 [employer] Standard (2018-02-09 16:28:23)

## 2020-10-22 NOTE — PROGRESS NOTES
Subjective   Elisabeth Bustos is a 40 y.o. female.   Chief Complaint   Patient presents with   • ADHD f/u     History of Present Illness   ADD follow up  She started Adderall XR 20 mg daily 1 month ago for treatment of ADD. Since starting medication, has noticed significant improvement of both concentration and productivity. Staying on task at work. Feels like she is able to prioritize how she completes tasks, instead of starting multiple and finishing none of them.   She has also cut out caffeine and tolerating medication without side effect.   She does notice that symptoms return in afternoon.   No chest pain, palpitations, increased headaches, sleep disturbance, or appetite changes.    The following portions of the patient's history were reviewed and updated as appropriate: allergies, current medications, past family history, past medical history, past social history, past surgical history and problem list.    Review of Systems   Constitutional: Negative for activity change and appetite change.   HENT: Negative.    Respiratory: Negative for chest tightness and shortness of breath.    Cardiovascular: Negative for chest pain and palpitations.   Neurological: Negative for light-headedness.   Hematological: Negative for adenopathy.   Psychiatric/Behavioral: Positive for decreased concentration. Negative for sleep disturbance.       Objective   Physical Exam  Vitals signs and nursing note reviewed.   Constitutional:       Appearance: She is well-developed.   HENT:      Head: Normocephalic and atraumatic.      Right Ear: External ear normal.      Left Ear: External ear normal.      Nose: Nose normal.   Eyes:      Conjunctiva/sclera: Conjunctivae normal.   Cardiovascular:      Rate and Rhythm: Normal rate and regular rhythm.      Heart sounds: Normal heart sounds. No murmur.   Pulmonary:      Effort: Pulmonary effort is normal.      Breath sounds: Normal breath sounds. No wheezing.   Musculoskeletal:          General: No deformity.   Skin:     General: Skin is warm and dry.   Neurological:      General: No focal deficit present.      Mental Status: She is alert and oriented to person, place, and time.   Psychiatric:         Mood and Affect: Mood normal.         Behavior: Behavior normal.         Thought Content: Thought content normal.           Assessment/Plan   Diagnoses and all orders for this visit:    1. Attention deficit disorder (ADD) without hyperactivity  -     amphetamine-dextroamphetamine XR (ADDERALL XR) 30 MG 24 hr capsule; Take 1 capsule by mouth Every Morning  Dispense: 30 capsule; Refill: 0      Continue treatment with Adderall, increased to 30 mg daily since she is having symptoms reappear in the afternoon.  Ceasar report reviewed.

## 2020-11-23 DIAGNOSIS — F98.8 ATTENTION DEFICIT DISORDER (ADD) WITHOUT HYPERACTIVITY: ICD-10-CM

## 2020-11-23 RX ORDER — DEXTROAMPHETAMINE SACCHARATE, AMPHETAMINE ASPARTATE MONOHYDRATE, DEXTROAMPHETAMINE SULFATE AND AMPHETAMINE SULFATE 7.5; 7.5; 7.5; 7.5 MG/1; MG/1; MG/1; MG/1
30 CAPSULE, EXTENDED RELEASE ORAL EVERY MORNING
Qty: 30 CAPSULE | Refills: 0 | Status: SHIPPED | OUTPATIENT
Start: 2020-11-23 | End: 2020-12-28 | Stop reason: SDUPTHER

## 2020-11-23 NOTE — TELEPHONE ENCOUNTER
Caller: Elisabeth Bustos    Relationship: Self    Best call back number: [phone]    Medication needed:   Requested Prescriptions     Pending Prescriptions Disp Refills   • amphetamine-dextroamphetamine XR (ADDERALL XR) 30 MG 24 hr capsule 30 capsule 0     Sig: Take 1 capsule by mouth Every Morning       Does the patient have less than a 3 day supply:  [] Yes  [x] No    What is the patient's preferred pharmacy: [facility] [address] [place] [address] [address] [place] [address] - [phone] [facility] [phone]

## 2020-12-28 DIAGNOSIS — F98.8 ATTENTION DEFICIT DISORDER (ADD) WITHOUT HYPERACTIVITY: ICD-10-CM

## 2020-12-28 NOTE — TELEPHONE ENCOUNTER
Caller: Elisabeth Bustos    Relationship: Self    Best call back number: [phone]    Medication needed:   Requested Prescriptions     Pending Prescriptions Disp Refills   • amphetamine-dextroamphetamine XR (ADDERALL XR) 30 MG 24 hr capsule 30 capsule 0     Sig: Take 1 capsule by mouth Every Morning       When do you need the refill by: ASAP    What details did the patient provide when requesting the medication: SHE DOES HAVE 3 DAYS BUT DOESN'T WANT RUN OUT OVER NEW YEARS    Does the patient have less than a 3 day supply:  [] Yes  [x] No    What is the patient's preferred pharmacy: [facility] [address] [place], [place] - [address] [facility] [address] - [phone]  - [phone] FX

## 2020-12-29 RX ORDER — DEXTROAMPHETAMINE SACCHARATE, AMPHETAMINE ASPARTATE MONOHYDRATE, DEXTROAMPHETAMINE SULFATE AND AMPHETAMINE SULFATE 7.5; 7.5; 7.5; 7.5 MG/1; MG/1; MG/1; MG/1
30 CAPSULE, EXTENDED RELEASE ORAL EVERY MORNING
Qty: 30 CAPSULE | Refills: 0 | Status: SHIPPED | OUTPATIENT
Start: 2020-12-29 | End: 2021-02-22

## 2021-02-17 ENCOUNTER — TELEPHONE (OUTPATIENT)
Dept: FAMILY MEDICINE CLINIC | Facility: CLINIC | Age: 41
End: 2021-02-17

## 2021-02-17 NOTE — TELEPHONE ENCOUNTER
Spoke with patient she usually does not have insurance so she stated she will continue to pay out of pocket. Stated she would call pharmacy and let them know as well.

## 2021-02-17 NOTE — TELEPHONE ENCOUNTER
Caller: Elisabeth Bustos    Relationship to patient: Self    Best call back number: [phone]  Patient is needing: PA FOR   amphetamine-dextroamphetamine XR (ADDERALL XR) 30 MG 24 hr capsule    IS NO LONGER NECESSARY. PATIENT WILL PAY OUT OF POCKET SINCE NOT COVERED BY INSURANCE

## 2021-02-17 NOTE — TELEPHONE ENCOUNTER
PATIENT HAS CALLED REQUESTING PRIOR AUTH ON amphetamine-dextroamphetamine XR (ADDERALL XR) 30 MG 24 hr capsule    CALL BACK NUMBER [phone]

## 2021-02-20 DIAGNOSIS — F98.8 ATTENTION DEFICIT DISORDER (ADD) WITHOUT HYPERACTIVITY: ICD-10-CM

## 2021-02-22 ENCOUNTER — TELEPHONE (OUTPATIENT)
Dept: FAMILY MEDICINE CLINIC | Facility: CLINIC | Age: 41
End: 2021-02-22

## 2021-02-22 RX ORDER — DEXTROAMPHETAMINE SACCHARATE, AMPHETAMINE ASPARTATE MONOHYDRATE, DEXTROAMPHETAMINE SULFATE AND AMPHETAMINE SULFATE 7.5; 7.5; 7.5; 7.5 MG/1; MG/1; MG/1; MG/1
CAPSULE, EXTENDED RELEASE ORAL
Qty: 30 CAPSULE | Refills: 0 | Status: SHIPPED | OUTPATIENT
Start: 2021-02-22 | End: 2021-03-26 | Stop reason: SDUPTHER

## 2021-02-22 NOTE — TELEPHONE ENCOUNTER
PATIENT CALLED TO CHECK ON MEDICATION REFILL; SHE WOULD LIKE IT TO BE FILLED TODAY TO PHARMACY ON FILE.

## 2021-03-26 DIAGNOSIS — F98.8 ATTENTION DEFICIT DISORDER (ADD) WITHOUT HYPERACTIVITY: ICD-10-CM

## 2021-03-30 RX ORDER — DEXTROAMPHETAMINE SACCHARATE, AMPHETAMINE ASPARTATE MONOHYDRATE, DEXTROAMPHETAMINE SULFATE AND AMPHETAMINE SULFATE 7.5; 7.5; 7.5; 7.5 MG/1; MG/1; MG/1; MG/1
30 CAPSULE, EXTENDED RELEASE ORAL EVERY MORNING
Qty: 30 CAPSULE | Refills: 0 | Status: SHIPPED | OUTPATIENT
Start: 2021-03-30 | End: 2021-05-25 | Stop reason: SDUPTHER

## 2021-04-22 ENCOUNTER — OFFICE VISIT (OUTPATIENT)
Dept: FAMILY MEDICINE CLINIC | Facility: CLINIC | Age: 41
End: 2021-04-22

## 2021-04-22 VITALS
BODY MASS INDEX: 30.92 KG/M2 | OXYGEN SATURATION: 99 % | RESPIRATION RATE: 14 BRPM | TEMPERATURE: 97.4 F | HEIGHT: 70 IN | DIASTOLIC BLOOD PRESSURE: 74 MMHG | SYSTOLIC BLOOD PRESSURE: 108 MMHG | WEIGHT: 216 LBS | HEART RATE: 64 BPM

## 2021-04-22 DIAGNOSIS — F98.8 ATTENTION DEFICIT DISORDER (ADD) WITHOUT HYPERACTIVITY: Primary | ICD-10-CM

## 2021-04-22 PROCEDURE — 99213 OFFICE O/P EST LOW 20 MIN: CPT | Performed by: FAMILY MEDICINE

## 2021-04-22 NOTE — PATIENT INSTRUCTIONS
Go to the nearest ER or return to clinic if symptoms worsen, fever/chill develop  Amphetamine; Dextroamphetamine tablets  What is this medicine?  AMPHETAMINE; DEXTROAMPHETAMINE(am FET a meen; dex troe am FET a meen) is used to treat attention-deficit hyperactivity disorder (ADHD). It may also be used for narcolepsy. Federal law prohibits giving this medicine to any person other than the person for whom it was prescribed. Do not share this medicine with anyone else.  This medicine may be used for other purposes; ask your health care provider or pharmacist if you have questions.  COMMON BRAND NAME(S): Adderall  What should I tell my health care provider before I take this medicine?  They need to know if you have any of these conditions:  · anxiety or panic attacks  · circulation problems in fingers and toes  · glaucoma  · hardening or blockages of the arteries or heart blood vessels  · heart disease or a heart defect  · high blood pressure  · history of a drug or alcohol abuse problem  · history of stroke  · kidney disease  · liver disease  · mental illness  · seizures  · suicidal thoughts, plans, or attempt; a previous suicide attempt by you or a family member  · thyroid disease  · Tourette's syndrome  · an unusual or allergic reaction to dextroamphetamine, other amphetamines, other medicines, foods, dyes, or preservatives  · pregnant or trying to get pregnant  · breast-feeding  How should I use this medicine?  Take this medicine by mouth with a glass of water. Follow the directions on the prescription label. Take your doses at regular intervals. Do not take your medicine more often than directed. Do not suddenly stop your medicine. You must gradually reduce the dose or you may feel withdrawal effects. Ask your doctor or health care professional for advice.  Talk to your pediatrician regarding the use of this medicine in children. Special care may be needed. While this drug may be prescribed for children as young as  3 years for selected conditions, precautions do apply.  Overdosage: If you think you have taken too much of this medicine contact a poison control center or emergency room at once.  NOTE: This medicine is only for you. Do not share this medicine with others.  What if I miss a dose?  If you miss a dose, take it as soon as you can. If it is almost time for your next dose, take only that dose. Do not take double or extra doses.  What may interact with this medicine?  Do not take this medicine with any of the following medications:  · MAOIs like Carbex, Eldepryl, Marplan, Nardil, and Parnate  · other stimulant medicines for attention disorders  This medicine may also interact with the following medications:  · acetazolamide  · ammonium chloride  · antacids  · ascorbic acid  · atomoxetine  · caffeine  · certain medicines for blood pressure  · certain medicines for depression, anxiety, or psychotic disturbances  · certain medicines for seizures like carbamazepine, phenobarbital, phenytoin  · certain medicines for stomach problems like cimetidine, ranitidine, famotidine, esomeprazole, omeprazole, lansoprazole, pantoprazole  · lithium  · medicines for colds and breathing difficulties  · medicines for diabetes  · medicines or dietary supplements for weight loss or to stay awake  · methenamine  · narcotic medicines for pain  · quinidine  · ritonavir  · sodium bicarbonate  · Kelly's wort  This list may not describe all possible interactions. Give your health care provider a list of all the medicines, herbs, non-prescription drugs, or dietary supplements you use. Also tell them if you smoke, drink alcohol, or use illegal drugs. Some items may interact with your medicine.  What should I watch for while using this medicine?  Visit your doctor or health care professional for regular checks on your progress. This prescription requires that you follow special procedures with your doctor and pharmacy. You will need to have a new  written prescription from your doctor every time you need a refill.  This medicine may affect your concentration, or hide signs of tiredness. Until you know how this medicine affects you, do not drive, ride a bicycle, use machinery, or do anything that needs mental alertness.  Tell your doctor or health care professional if this medicine loses its effects, or if you feel you need to take more than the prescribed amount. Do not change the dosage without talking to your doctor or health care professional.  Decreased appetite is a common side effect when starting this medicine. Eating small, frequent meals or snacks can help. Talk to your doctor if you continue to have poor eating habits. Height and weight growth of a child taking this medicine will be monitored closely.  Do not take this medicine close to bedtime. It may prevent you from sleeping.  If you are going to need surgery, a MRI, CT scan, or other procedure, tell your doctor that you are taking this medicine. You may need to stop taking this medicine before the procedure.  Tell your doctor or healthcare professional right away if you notice unexplained wounds on your fingers and toes while taking this medicine. You should also tell your healthcare provider if you experience numbness or pain, changes in the skin color, or sensitivity to temperature in your fingers or toes.  What side effects may I notice from receiving this medicine?  Side effects that you should report to your doctor or health care professional as soon as possible:  · allergic reactions like skin rash, itching or hives, swelling of the face, lips, or tongue  · anxious  · breathing problems  · changes in emotions or moods  · changes in vision  · chest pain or chest tightness  · fast, irregular heartbeat  · fingers or toes feel numb, cool, painful  · hallucination, loss of contact with reality  · high blood pressure  · males: prolonged or painful erection  · seizures  · signs and symptoms of  serotonin syndrome like confusion, increased sweating, fever, tremor, stiff muscles, diarrhea  · signs and symptoms of a stroke like changes in vision; confusion; trouble speaking or understanding; severe headaches; sudden numbness or weakness of the face, arm or leg; trouble walking; dizziness; loss of balance or coordination  · suicidal thoughts or other mood changes  · uncontrollable head, mouth, neck, arm, or leg movements  Side effects that usually do not require medical attention (report to your doctor or health care professional if they continue or are bothersome):  · dry mouth  · headache  · irritability  · loss of appetite  · nausea  · trouble sleeping  · weight loss  This list may not describe all possible side effects. Call your doctor for medical advice about side effects. You may report side effects to FDA at 5-686-HCB-6477.  Where should I keep my medicine?  Keep out of the reach of children. This medicine can be abused. Keep your medicine in a safe place to protect it from theft. Do not share this medicine with anyone. Selling or giving away this medicine is dangerous and against the law.  Store at room temperature between 15 and 30 degrees C (59 and 86 degrees F). Keep container tightly closed. Throw away any unused medicine after the expiration date. Dispose of properly. This medicine may cause accidental overdose and death if it is taken by other adults, children, or pets. Mix any unused medicine with a substance like cat litter or coffee grounds. Then throw the medicine away in a sealed container like a sealed bag or a coffee can with a lid. Do not use the medicine after the expiration date.  NOTE: This sheet is a summary. It may not cover all possible information. If you have questions about this medicine, talk to your doctor, pharmacist, or health care provider.  © 2021 [employer] (2018-02-09 16:28:23)

## 2021-04-22 NOTE — PROGRESS NOTES
"Chief Complaint  ADD follow up    Subjective          Elisabeth Bustos presents to [facility]  History of Present Illness  ADD follow up  Chronic condition, currently treated with Adderall XR 30 mg daily.   With treatment, has noticed a significant improvement of both concentration and productivity. Able to stay on task at work   Tolerating medication without side effect.   Current dose is working throughout the day. Sleeping good at night.   No chest pain, palpitations, increased headaches, sleep disturbance, or appetite changes.    Answers for HPI/ROS submitted by the patient on 4/15/2021  Please describe your symptoms.: Follow up appointment.  Have you had these symptoms before?: No  How long have you been having these symptoms?: Greater than 2 weeks  Please list any medications you are currently taking for this condition.: Adderall  Please describe any probable cause for these symptoms. : Everything is normal with the medication  What is the primary reason for your visit?: Other    The following portions of the patient's history were reviewed and updated as appropriate: allergies, current medications, past family history, past medical history, past social history, past surgical history and problem list.    Objective   Vital Signs:   /74   Pulse 64   Temp 97.4 °F (36.3 °C)   Resp 14   Ht 177.8 cm (70\")   Wt 98 kg (216 lb)   SpO2 99%   BMI 30.99 kg/m²     Physical Exam  Vitals and nursing note reviewed.   Constitutional:       Appearance: She is well-developed.   HENT:      Head: Normocephalic and atraumatic.      Right Ear: External ear normal.      Left Ear: External ear normal.      Nose: Nose normal.   Eyes:      Conjunctiva/sclera: Conjunctivae normal.   Cardiovascular:      Rate and Rhythm: Normal rate and regular rhythm.      Heart sounds: Normal heart sounds. No murmur heard.     Pulmonary:      Effort: Pulmonary effort is normal.      Breath sounds: " Normal breath sounds.   Musculoskeletal:         General: No swelling or deformity.      Cervical back: Neck supple.   Skin:     General: Skin is warm and dry.   Neurological:      General: No focal deficit present.      Mental Status: She is alert and oriented to person, place, and time.   Psychiatric:         Mood and Affect: Mood normal.         Behavior: Behavior normal.         Thought Content: Thought content normal.        Result Review :                 Assessment and Plan    Diagnoses and all orders for this visit:    1. Attention deficit disorder (ADD) without hyperactivity (Primary)      ADD doing great with Adderall XR 30 mg.    UDS updated today and Ceasar report reviewed.       Follow Up   Return in about 6 months (around 10/22/2021) for Recheck.  Patient was given instructions and counseling regarding her condition or for health maintenance advice. Please see specific information pulled into the AVS if appropriate.

## 2021-05-25 DIAGNOSIS — F98.8 ATTENTION DEFICIT DISORDER (ADD) WITHOUT HYPERACTIVITY: ICD-10-CM

## 2021-05-25 RX ORDER — DEXTROAMPHETAMINE SACCHARATE, AMPHETAMINE ASPARTATE MONOHYDRATE, DEXTROAMPHETAMINE SULFATE AND AMPHETAMINE SULFATE 7.5; 7.5; 7.5; 7.5 MG/1; MG/1; MG/1; MG/1
30 CAPSULE, EXTENDED RELEASE ORAL EVERY MORNING
Qty: 30 CAPSULE | Refills: 0 | Status: SHIPPED | OUTPATIENT
Start: 2021-05-25 | End: 2021-06-25 | Stop reason: SDUPTHER

## 2021-05-25 NOTE — TELEPHONE ENCOUNTER
Caller: Elisabeth Bustos    Relationship: Self    Best call back number: [phone]    Medication needed:   Requested Prescriptions     Pending Prescriptions Disp Refills   • amphetamine-dextroamphetamine XR (ADDERALL XR) 30 MG 24 hr capsule 30 capsule 0     Sig: Take 1 capsule by mouth Every Morning       When do you need the refill by: TODAY    Does the patient have less than a 3 day supply:  [x] Yes  [] No    What is the patient's preferred pharmacy: [facility] [id number] - [address] - [phone] [facility] [phone] FX

## 2021-06-25 DIAGNOSIS — F98.8 ATTENTION DEFICIT DISORDER (ADD) WITHOUT HYPERACTIVITY: ICD-10-CM

## 2021-06-25 RX ORDER — DEXTROAMPHETAMINE SACCHARATE, AMPHETAMINE ASPARTATE MONOHYDRATE, DEXTROAMPHETAMINE SULFATE AND AMPHETAMINE SULFATE 7.5; 7.5; 7.5; 7.5 MG/1; MG/1; MG/1; MG/1
30 CAPSULE, EXTENDED RELEASE ORAL EVERY MORNING
Qty: 30 CAPSULE | Refills: 0 | Status: SHIPPED | OUTPATIENT
Start: 2021-06-25 | End: 2021-07-23 | Stop reason: SDUPTHER

## 2021-07-23 DIAGNOSIS — F98.8 ATTENTION DEFICIT DISORDER (ADD) WITHOUT HYPERACTIVITY: ICD-10-CM

## 2021-07-23 RX ORDER — DEXTROAMPHETAMINE SACCHARATE, AMPHETAMINE ASPARTATE MONOHYDRATE, DEXTROAMPHETAMINE SULFATE AND AMPHETAMINE SULFATE 7.5; 7.5; 7.5; 7.5 MG/1; MG/1; MG/1; MG/1
30 CAPSULE, EXTENDED RELEASE ORAL EVERY MORNING
Qty: 30 CAPSULE | Refills: 0 | Status: SHIPPED | OUTPATIENT
Start: 2021-07-23 | End: 2021-08-17 | Stop reason: SDUPTHER

## 2021-08-17 DIAGNOSIS — F98.8 ATTENTION DEFICIT DISORDER (ADD) WITHOUT HYPERACTIVITY: ICD-10-CM

## 2021-08-20 RX ORDER — DEXTROAMPHETAMINE SACCHARATE, AMPHETAMINE ASPARTATE MONOHYDRATE, DEXTROAMPHETAMINE SULFATE AND AMPHETAMINE SULFATE 7.5; 7.5; 7.5; 7.5 MG/1; MG/1; MG/1; MG/1
30 CAPSULE, EXTENDED RELEASE ORAL EVERY MORNING
Qty: 30 CAPSULE | Refills: 0 | Status: SHIPPED | OUTPATIENT
Start: 2021-08-20 | End: 2021-10-21 | Stop reason: SDUPTHER

## 2021-10-01 ENCOUNTER — OFFICE VISIT (OUTPATIENT)
Dept: FAMILY MEDICINE CLINIC | Facility: CLINIC | Age: 41
End: 2021-10-01

## 2021-10-01 VITALS
RESPIRATION RATE: 18 BRPM | TEMPERATURE: 97.7 F | OXYGEN SATURATION: 99 % | DIASTOLIC BLOOD PRESSURE: 76 MMHG | WEIGHT: 225 LBS | BODY MASS INDEX: 32.21 KG/M2 | HEIGHT: 70 IN | SYSTOLIC BLOOD PRESSURE: 108 MMHG | HEART RATE: 62 BPM

## 2021-10-01 DIAGNOSIS — B77.9 ROUNDWORM INFECTION: Primary | ICD-10-CM

## 2021-10-01 PROCEDURE — 99213 OFFICE O/P EST LOW 20 MIN: CPT | Performed by: FAMILY MEDICINE

## 2021-10-21 ENCOUNTER — OFFICE VISIT (OUTPATIENT)
Dept: FAMILY MEDICINE CLINIC | Facility: CLINIC | Age: 41
End: 2021-10-21

## 2021-10-21 VITALS
HEIGHT: 70 IN | OXYGEN SATURATION: 98 % | BODY MASS INDEX: 31.87 KG/M2 | WEIGHT: 222.6 LBS | DIASTOLIC BLOOD PRESSURE: 86 MMHG | SYSTOLIC BLOOD PRESSURE: 124 MMHG | HEART RATE: 57 BPM | TEMPERATURE: 97.5 F

## 2021-10-21 DIAGNOSIS — K59.04 CHRONIC IDIOPATHIC CONSTIPATION: ICD-10-CM

## 2021-10-21 DIAGNOSIS — F98.8 ATTENTION DEFICIT DISORDER (ADD) WITHOUT HYPERACTIVITY: Primary | ICD-10-CM

## 2021-10-21 PROCEDURE — 99213 OFFICE O/P EST LOW 20 MIN: CPT | Performed by: FAMILY MEDICINE

## 2021-10-21 RX ORDER — POLYETHYLENE GLYCOL 3350 17 G/17G
17 POWDER, FOR SOLUTION ORAL DAILY PRN
Qty: 289 G | Refills: 5 | Status: SHIPPED | OUTPATIENT
Start: 2021-10-21 | End: 2022-02-08 | Stop reason: SDUPTHER

## 2021-10-21 RX ORDER — DOCUSATE SODIUM 100 MG/1
100 CAPSULE, LIQUID FILLED ORAL 2 TIMES DAILY PRN
Qty: 60 CAPSULE | Refills: 5 | Status: SHIPPED | OUTPATIENT
Start: 2021-10-21 | End: 2022-02-08 | Stop reason: SDUPTHER

## 2021-10-21 RX ORDER — DEXTROAMPHETAMINE SACCHARATE, AMPHETAMINE ASPARTATE MONOHYDRATE, DEXTROAMPHETAMINE SULFATE AND AMPHETAMINE SULFATE 7.5; 7.5; 7.5; 7.5 MG/1; MG/1; MG/1; MG/1
30 CAPSULE, EXTENDED RELEASE ORAL EVERY MORNING
Qty: 30 CAPSULE | Refills: 0 | Status: SHIPPED | OUTPATIENT
Start: 2021-10-21 | End: 2021-11-15 | Stop reason: SDUPTHER

## 2021-10-21 NOTE — PROGRESS NOTES
"Chief Complaint  Med Refill    Subjective          Elisabeth Bustos presents to [facility]  History of Present Illness  ADD follow up  Chronic condition, currently treated with Adderall XR 30 mg daily.   Doing well with treatment. Improvement of concentration and productivity,   With treatment, has noticed a significant improvement of both concentration and productivity.   Reports no side effect or concern regarding medication.  Sleeping good at night.   No chest pain, palpitations, increased headaches, sleep disturbance, or appetite changes.    Constipation   Chronic issue, has treated with magnesium citrate and water enema when needed.  Usually only 1 bowel movement per week.    Answers for HPI/ROS submitted by the patient on 10/14/2021  Please describe your symptoms.: 6 Month follow up  Have you had these symptoms before?: Yes  How long have you been having these symptoms?: Greater than 2 weeks  What is the primary reason for your visit?: Other      The following portions of the patient's history were reviewed and updated as appropriate: allergies, current medications, past family history, past medical history, past social history, past surgical history and problem list.    Objective   Vital Signs:   /86   Pulse 57   Temp 97.5 °F (36.4 °C)   Ht 177.8 cm (70\")   Wt 101 kg (222 lb 9.6 oz)   SpO2 98%   BMI 31.94 kg/m²     Physical Exam  Nursing note reviewed.   Constitutional:       Appearance: Normal appearance. She is well-developed.   HENT:      Head: Normocephalic and atraumatic.      Right Ear: External ear normal.      Left Ear: External ear normal.      Nose: Nose normal.   Eyes:      Conjunctiva/sclera: Conjunctivae normal.   Cardiovascular:      Rate and Rhythm: Normal rate and regular rhythm.      Heart sounds: Normal heart sounds. No murmur heard.      Pulmonary:      Effort: Pulmonary effort is normal.      Breath sounds: Normal breath sounds. "   Musculoskeletal:         General: No deformity.      Cervical back: Neck supple.   Skin:     General: Skin is warm and dry.   Neurological:      General: No focal deficit present.      Mental Status: She is alert and oriented to person, place, and time.   Psychiatric:         Mood and Affect: Mood normal.         Behavior: Behavior normal.         Thought Content: Thought content normal.        Result Review :                 Assessment and Plan    Diagnoses and all orders for this visit:    1. Attention deficit disorder (ADD) without hyperactivity (Primary)  -     amphetamine-dextroamphetamine XR (ADDERALL XR) 30 MG 24 hr capsule; Take 1 capsule by mouth Every Morning  Dispense: 30 capsule; Refill: 0    2. Chronic idiopathic constipation  -     polyethylene glycol (MiraLax) 17 GM/SCOOP powder; Take 17 g by mouth Daily As Needed (constipation).  Dispense: 289 g; Refill: 5  -     docusate sodium (Colace) 100 MG capsule; Take 1 capsule by mouth 2 (Two) Times a Day As Needed for Constipation.  Dispense: 60 capsule; Refill: 5    ADD doing well with current treatment, no changes. UDS updated April 2021  MiraLax and Colace to improve constipation. If ineffective, she will follow up.      Follow Up   No follow-ups on file.  Patient was given instructions and counseling regarding her condition or for health maintenance advice. Please see specific information pulled into the AVS if appropriate.

## 2021-10-26 LAB
O+P SPEC MICRO: NORMAL
O+P STL CONC: NORMAL

## 2021-11-15 DIAGNOSIS — F98.8 ATTENTION DEFICIT DISORDER (ADD) WITHOUT HYPERACTIVITY: ICD-10-CM

## 2021-11-15 RX ORDER — DEXTROAMPHETAMINE SACCHARATE, AMPHETAMINE ASPARTATE MONOHYDRATE, DEXTROAMPHETAMINE SULFATE AND AMPHETAMINE SULFATE 7.5; 7.5; 7.5; 7.5 MG/1; MG/1; MG/1; MG/1
30 CAPSULE, EXTENDED RELEASE ORAL EVERY MORNING
Qty: 30 CAPSULE | Refills: 0 | Status: SHIPPED | OUTPATIENT
Start: 2021-11-15 | End: 2021-12-27 | Stop reason: SDUPTHER

## 2021-11-15 NOTE — TELEPHONE ENCOUNTER
Rx Refill Note  Requested Prescriptions     Pending Prescriptions Disp Refills   • amphetamine-dextroamphetamine XR (ADDERALL XR) 30 MG 24 hr capsule 30 capsule 0     Sig: Take 1 capsule by mouth Every Morning      Last office visit with prescribing clinician: 10/21/2021      Next office visit with prescribing clinician: Visit date not found            Clarissa Beltran MA  11/15/21, 08:27 EST

## 2021-12-27 DIAGNOSIS — F98.8 ATTENTION DEFICIT DISORDER (ADD) WITHOUT HYPERACTIVITY: ICD-10-CM

## 2021-12-28 RX ORDER — DEXTROAMPHETAMINE SACCHARATE, AMPHETAMINE ASPARTATE MONOHYDRATE, DEXTROAMPHETAMINE SULFATE AND AMPHETAMINE SULFATE 7.5; 7.5; 7.5; 7.5 MG/1; MG/1; MG/1; MG/1
30 CAPSULE, EXTENDED RELEASE ORAL EVERY MORNING
Qty: 30 CAPSULE | Refills: 0 | Status: SHIPPED | OUTPATIENT
Start: 2021-12-28 | End: 2022-02-08 | Stop reason: SDUPTHER

## 2022-02-08 DIAGNOSIS — K59.04 CHRONIC IDIOPATHIC CONSTIPATION: ICD-10-CM

## 2022-02-08 DIAGNOSIS — F98.8 ATTENTION DEFICIT DISORDER (ADD) WITHOUT HYPERACTIVITY: ICD-10-CM

## 2022-02-08 RX ORDER — DOCUSATE SODIUM 100 MG/1
100 CAPSULE, LIQUID FILLED ORAL 2 TIMES DAILY PRN
Qty: 60 CAPSULE | Refills: 5 | Status: SHIPPED | OUTPATIENT
Start: 2022-02-08

## 2022-02-08 RX ORDER — POLYETHYLENE GLYCOL 3350 17 G/17G
17 POWDER, FOR SOLUTION ORAL DAILY PRN
Qty: 289 G | Refills: 5 | Status: SHIPPED | OUTPATIENT
Start: 2022-02-08

## 2022-02-08 RX ORDER — DEXTROAMPHETAMINE SACCHARATE, AMPHETAMINE ASPARTATE MONOHYDRATE, DEXTROAMPHETAMINE SULFATE AND AMPHETAMINE SULFATE 7.5; 7.5; 7.5; 7.5 MG/1; MG/1; MG/1; MG/1
30 CAPSULE, EXTENDED RELEASE ORAL EVERY MORNING
Qty: 30 CAPSULE | Refills: 0 | Status: SHIPPED | OUTPATIENT
Start: 2022-02-08 | End: 2022-03-09 | Stop reason: SDUPTHER

## 2022-02-08 NOTE — TELEPHONE ENCOUNTER
Caller: Elisabeth Bustos    Relationship: Self    Best call back number: [phone]  Requested Prescriptions:   Requested Prescriptions     Pending Prescriptions Disp Refills   • amphetamine-dextroamphetamine XR (ADDERALL XR) 30 MG 24 hr capsule 30 capsule 0     Sig: Take 1 capsule by mouth Every Morning   • docusate sodium (Colace) 100 MG capsule 60 capsule 5     Sig: Take 1 capsule by mouth 2 (Two) Times a Day As Needed for Constipation.   • polyethylene glycol (MiraLax) 17 GM/SCOOP powder 289 g 5     Sig: Take 17 g by mouth Daily As Needed (constipation).        Pharmacy where request should be sent: [facility] [id number] [facility] [address] [place] [address] - [phone] [facility] [phone]   [phone]    Additional details provided by patient: PATIENT IS OUT OF MEDICATION     Does the patient have less than a 3 day supply:  [x] Yes  [] No    Poli Mera Rep   02/08/22 12:12 EST

## 2022-03-09 DIAGNOSIS — F98.8 ATTENTION DEFICIT DISORDER (ADD) WITHOUT HYPERACTIVITY: ICD-10-CM

## 2022-03-09 RX ORDER — DEXTROAMPHETAMINE SACCHARATE, AMPHETAMINE ASPARTATE MONOHYDRATE, DEXTROAMPHETAMINE SULFATE AND AMPHETAMINE SULFATE 7.5; 7.5; 7.5; 7.5 MG/1; MG/1; MG/1; MG/1
30 CAPSULE, EXTENDED RELEASE ORAL EVERY MORNING
Qty: 30 CAPSULE | Refills: 0 | Status: SHIPPED | OUTPATIENT
Start: 2022-03-09 | End: 2022-04-12 | Stop reason: SDUPTHER

## 2022-03-09 NOTE — TELEPHONE ENCOUNTER
Caller: Elisabeth Bustos    Relationship: Self    Best call back number:[phone]    Requested Prescriptions:   Requested Prescriptions     Pending Prescriptions Disp Refills   • amphetamine-dextroamphetamine XR (ADDERALL XR) 30 MG 24 hr capsule 30 capsule 0     Sig: Take 1 capsule by mouth Every Morning        Pharmacy where request should be sent: [facility] [address] [place] [address] [address] [place] [address] - [phone]  - [phone] FX     Additional details provided by patient:     Does the patient have less than a 3 day supply:  [x] Yes  [] No    SiPoli Green Rep   03/09/22 14:10 EST

## 2022-04-12 DIAGNOSIS — F98.8 ATTENTION DEFICIT DISORDER (ADD) WITHOUT HYPERACTIVITY: ICD-10-CM

## 2022-04-13 RX ORDER — DEXTROAMPHETAMINE SACCHARATE, AMPHETAMINE ASPARTATE MONOHYDRATE, DEXTROAMPHETAMINE SULFATE AND AMPHETAMINE SULFATE 7.5; 7.5; 7.5; 7.5 MG/1; MG/1; MG/1; MG/1
30 CAPSULE, EXTENDED RELEASE ORAL EVERY MORNING
Qty: 30 CAPSULE | Refills: 0 | Status: SHIPPED | OUTPATIENT
Start: 2022-04-13 | End: 2022-05-11 | Stop reason: SDUPTHER

## 2022-04-15 ENCOUNTER — OFFICE VISIT (OUTPATIENT)
Dept: FAMILY MEDICINE CLINIC | Facility: CLINIC | Age: 42
End: 2022-04-15

## 2022-04-15 VITALS
HEIGHT: 70 IN | WEIGHT: 231 LBS | BODY MASS INDEX: 33.07 KG/M2 | SYSTOLIC BLOOD PRESSURE: 122 MMHG | RESPIRATION RATE: 18 BRPM | HEART RATE: 68 BPM | TEMPERATURE: 97.4 F | OXYGEN SATURATION: 99 % | DIASTOLIC BLOOD PRESSURE: 84 MMHG

## 2022-04-15 DIAGNOSIS — F98.8 ATTENTION DEFICIT DISORDER (ADD) WITHOUT HYPERACTIVITY: Primary | ICD-10-CM

## 2022-04-15 DIAGNOSIS — E04.9 THYROID GOITER: ICD-10-CM

## 2022-04-15 DIAGNOSIS — R63.5 WEIGHT GAIN: ICD-10-CM

## 2022-04-15 PROCEDURE — 99214 OFFICE O/P EST MOD 30 MIN: CPT | Performed by: FAMILY MEDICINE

## 2022-04-15 NOTE — PROGRESS NOTES
"Chief Complaint  6mo f/u ADD     Subjective          Elisabeth Bustos presents to [facility]  History of Present Illness  ADD follow up  Chronic condition, currently treated with Adderall XR 30 mg daily.   Doing well with treatment and dose    Reports no side effect or concern regarding medication.  No issues with sleep   No chest pain, palpitations, increased headaches, sleep disturbance, or appetite changes.    She has been gaining weight, even with efforts to try and lose.    Eating healthier and started going to gym, but gaining weight instead of losing  She has a thyroid goiter, ultrasound in 2016. Denies dysphagia symptoms     The following portions of the patient's history were reviewed and updated as appropriate: allergies, current medications, past family history, past medical history, past social history, past surgical history and problem list.    Objective   Vital Signs:   /84   Pulse 68   Temp 97.4 °F (36.3 °C)   Resp 18   Ht 177.8 cm (70\")   Wt 105 kg (231 lb)   SpO2 99%   BMI 33.15 kg/m²     Physical Exam  Nursing note reviewed.   Constitutional:       Appearance: Normal appearance. She is well-developed.   HENT:      Head: Normocephalic and atraumatic.      Right Ear: External ear normal.      Left Ear: External ear normal.      Nose: Nose normal.   Eyes:      Conjunctiva/sclera: Conjunctivae normal.   Neck:      Thyroid: No thyromegaly or thyroid tenderness.   Cardiovascular:      Rate and Rhythm: Normal rate and regular rhythm.      Heart sounds: Normal heart sounds. No murmur heard.  Pulmonary:      Effort: Pulmonary effort is normal.      Breath sounds: Normal breath sounds.   Musculoskeletal:         General: No deformity.      Cervical back: Neck supple.   Skin:     General: Skin is warm and dry.   Neurological:      General: No focal deficit present.      Mental Status: She is alert and oriented to person, place, and time.   Psychiatric:       "   Mood and Affect: Mood normal.         Behavior: Behavior normal.         Thought Content: Thought content normal.        Result Review :                 Assessment and Plan    Diagnoses and all orders for this visit:    1. 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 Thyroid goiter  -     Comprehensive Metabolic Panel  -     CBC & Differential  -     Hemoglobin A1c  -     TSH Rfx On Abnormal To Free T4  -     Insulin, Total  -     US Thyroid    3. Weight gain  -     Comprehensive Metabolic Panel  -     CBC & Differential  -     Hemoglobin A1c  -     TSH Rfx On Abnormal To Free T4  -     Insulin, Total    Labs completed today  She will start limiting calories to 1800/day, along with continuing routine exercise. If she wants formal consult with nutritionist, she will notify me.   Repeat thyroid ultrasound    Follow Up   Return in about 6 months (around 10/15/2022) for Recheck.  Patient was given instructions and counseling regarding her condition or for health maintenance advice. Please see specific information pulled into the AVS if appropriate.

## 2022-04-16 LAB
ALBUMIN SERPL-MCNC: 4.2 G/DL (ref 3.8–4.8)
ALBUMIN/GLOB SERPL: 1.8 {RATIO} (ref 1.2–2.2)
ALP SERPL-CCNC: 61 IU/L (ref 44–121)
ALT SERPL-CCNC: 12 IU/L (ref 0–32)
AST SERPL-CCNC: 16 IU/L (ref 0–40)
BASOPHILS # BLD AUTO: 0 X10E3/UL (ref 0–0.2)
BASOPHILS NFR BLD AUTO: 0 %
BILIRUB SERPL-MCNC: 0.7 MG/DL (ref 0–1.2)
BUN SERPL-MCNC: 14 MG/DL (ref 6–24)
BUN/CREAT SERPL: 17 (ref 9–23)
CALCIUM SERPL-MCNC: 9.2 MG/DL (ref 8.7–10.2)
CHLORIDE SERPL-SCNC: 104 MMOL/L (ref 96–106)
CO2 SERPL-SCNC: 22 MMOL/L (ref 20–29)
CREAT SERPL-MCNC: 0.83 MG/DL (ref 0.57–1)
EGFRCR SERPLBLD CKD-EPI 2021: 89 ML/MIN/1.73
EOSINOPHIL # BLD AUTO: 0.1 X10E3/UL (ref 0–0.4)
EOSINOPHIL NFR BLD AUTO: 2 %
ERYTHROCYTE [DISTWIDTH] IN BLOOD BY AUTOMATED COUNT: 14 % (ref 11.7–15.4)
GLOBULIN SER CALC-MCNC: 2.3 G/DL (ref 1.5–4.5)
GLUCOSE SERPL-MCNC: 89 MG/DL (ref 65–99)
HBA1C MFR BLD: 5.5 % (ref 4.8–5.6)
HCT VFR BLD AUTO: 40.2 % (ref 34–46.6)
HGB BLD-MCNC: 13.2 G/DL (ref 11.1–15.9)
IMM GRANULOCYTES # BLD AUTO: 0 X10E3/UL (ref 0–0.1)
IMM GRANULOCYTES NFR BLD AUTO: 1 %
INSULIN SERPL-ACNC: 8.8 UIU/ML (ref 2.6–24.9)
LYMPHOCYTES # BLD AUTO: 1.8 X10E3/UL (ref 0.7–3.1)
LYMPHOCYTES NFR BLD AUTO: 30 %
MCH RBC QN AUTO: 28.3 PG (ref 26.6–33)
MCHC RBC AUTO-ENTMCNC: 32.8 G/DL (ref 31.5–35.7)
MCV RBC AUTO: 86 FL (ref 79–97)
MONOCYTES # BLD AUTO: 0.4 X10E3/UL (ref 0.1–0.9)
MONOCYTES NFR BLD AUTO: 7 %
NEUTROPHILS # BLD AUTO: 3.6 X10E3/UL (ref 1.4–7)
NEUTROPHILS NFR BLD AUTO: 60 %
PLATELET # BLD AUTO: 233 X10E3/UL (ref 150–450)
POTASSIUM SERPL-SCNC: 4.3 MMOL/L (ref 3.5–5.2)
PROT SERPL-MCNC: 6.5 G/DL (ref 6–8.5)
RBC # BLD AUTO: 4.66 X10E6/UL (ref 3.77–5.28)
SODIUM SERPL-SCNC: 141 MMOL/L (ref 134–144)
TSH SERPL DL<=0.005 MIU/L-ACNC: 1.49 UIU/ML (ref 0.45–4.5)
WBC # BLD AUTO: 5.9 X10E3/UL (ref 3.4–10.8)

## 2022-05-10 ENCOUNTER — HOSPITAL ENCOUNTER (OUTPATIENT)
Dept: ULTRASOUND IMAGING | Facility: HOSPITAL | Age: 42
Discharge: HOME | End: 2022-05-10
Admitting: FAMILY MEDICINE

## 2022-05-10 PROCEDURE — 76536 US EXAM OF HEAD AND NECK: CPT

## 2022-05-11 DIAGNOSIS — F98.8 ATTENTION DEFICIT DISORDER (ADD) WITHOUT HYPERACTIVITY: ICD-10-CM

## 2022-05-12 RX ORDER — DEXTROAMPHETAMINE SACCHARATE, AMPHETAMINE ASPARTATE MONOHYDRATE, DEXTROAMPHETAMINE SULFATE AND AMPHETAMINE SULFATE 7.5; 7.5; 7.5; 7.5 MG/1; MG/1; MG/1; MG/1
30 CAPSULE, EXTENDED RELEASE ORAL EVERY MORNING
Qty: 30 CAPSULE | Refills: 0 | Status: SHIPPED | OUTPATIENT
Start: 2022-05-12 | End: 2022-06-14 | Stop reason: SDUPTHER

## 2022-05-19 DIAGNOSIS — E04.1 THYROID NODULE: Primary | ICD-10-CM

## 2022-06-14 DIAGNOSIS — F98.8 ATTENTION DEFICIT DISORDER (ADD) WITHOUT HYPERACTIVITY: ICD-10-CM

## 2022-06-14 RX ORDER — DEXTROAMPHETAMINE SACCHARATE, AMPHETAMINE ASPARTATE MONOHYDRATE, DEXTROAMPHETAMINE SULFATE AND AMPHETAMINE SULFATE 7.5; 7.5; 7.5; 7.5 MG/1; MG/1; MG/1; MG/1
30 CAPSULE, EXTENDED RELEASE ORAL EVERY MORNING
Qty: 30 CAPSULE | Refills: 0 | Status: SHIPPED | OUTPATIENT
Start: 2022-06-14 | End: 2022-08-09 | Stop reason: SDUPTHER

## 2022-08-09 DIAGNOSIS — F98.8 ATTENTION DEFICIT DISORDER (ADD) WITHOUT HYPERACTIVITY: ICD-10-CM

## 2022-08-09 RX ORDER — DEXTROAMPHETAMINE SACCHARATE, AMPHETAMINE ASPARTATE MONOHYDRATE, DEXTROAMPHETAMINE SULFATE AND AMPHETAMINE SULFATE 7.5; 7.5; 7.5; 7.5 MG/1; MG/1; MG/1; MG/1
30 CAPSULE, EXTENDED RELEASE ORAL EVERY MORNING
Qty: 30 CAPSULE | Refills: 0 | Status: SHIPPED | OUTPATIENT
Start: 2022-08-09 | End: 2022-09-19 | Stop reason: SDUPTHER

## 2022-08-30 ENCOUNTER — OFFICE VISIT (OUTPATIENT)
Dept: ENDOCRINOLOGY | Facility: CLINIC | Age: 42
End: 2022-08-30

## 2022-08-30 VITALS
DIASTOLIC BLOOD PRESSURE: 70 MMHG | WEIGHT: 214 LBS | OXYGEN SATURATION: 98 % | HEIGHT: 70 IN | HEART RATE: 61 BPM | SYSTOLIC BLOOD PRESSURE: 120 MMHG | BODY MASS INDEX: 30.64 KG/M2

## 2022-08-30 DIAGNOSIS — E04.2 MULTIPLE THYROID NODULES: Primary | ICD-10-CM

## 2022-08-30 PROCEDURE — 99203 OFFICE O/P NEW LOW 30 MIN: CPT | Performed by: INTERNAL MEDICINE

## 2022-08-30 NOTE — ASSESSMENT & PLAN NOTE
She has multiple thyroid nodules that all appear to be <5mm in size.  These are too small to attempt FNA and too small to cause any symptoms.  She has been euthyroid.  We discussed continued observation.  Consider another neck u/s in about a year.

## 2022-08-30 NOTE — PROGRESS NOTES
Office Note      Date: 2022  Patient Name: Elisabeth Bustos  MRN: [id number]  : 1980    Chief Complaint   Patient presents with   • Thyroid Problem     Nodule       History of Present Illness:   Elisabeth Bustos is a 42 y.o. female who presents for Thyroid Problem (Nodule)    She has been euthyroid.  She isn't taking any thyroid medications.  She had labs done 2022.  The TSH was normal at 1.49.  She reports having incidentally discovered thyroid nodules in 2016 at the time of imaging for pulmonary embolus.  She had neck u/s done 5/10/22.  This showed heterogeneity of the thyroid.  Bilateral nodules were noted.  There were reported as tiny.  However, the size of the nodules was listed as 2.2cm in the report.  However the measurements on the images don't show any lesions of 2.2cm.  It appears that both measured nodules were <5mm in size.  She denies any biotin use.  She c/o inability to lose weight.  She notes fatigue.  She notes occ palpitations.    Subjective      Patient was born where: [place].  Facial radiation exposure: No.  High iodine intake: No  Family hx of thyroid disease: No.    Review of Systems:   Review of Systems   Constitutional: Positive for fatigue.   Eyes: Negative.    Respiratory: Positive for cough.    Cardiovascular: Positive for palpitations.   Gastrointestinal: Positive for constipation.   Endocrine: Negative.    Genitourinary: Negative.    Musculoskeletal: Positive for back pain, myalgias, neck pain and neck stiffness.   Skin: Negative.    Allergic/Immunologic: Negative.    Neurological: Positive for headaches.   Hematological: Negative.    Psychiatric/Behavioral: Negative.        The following portions of the patient's history were reviewed and updated as appropriate: allergies, current medications, past family history, past medical history, past social history, past surgical history and problem list.    Objective     Visit Vitals  /70 (BP Location: Left  "arm, Patient Position: Sitting, Cuff Size: Adult)   Pulse 61   Ht 177.8 cm (70\")   Wt 97.1 kg (214 lb)   SpO2 98%   BMI 30.71 kg/m²       Physical Exam:  Physical Exam  Constitutional:       Appearance: Normal appearance.   HENT:      Head: Normocephalic and atraumatic.   Eyes:      Extraocular Movements: Extraocular movements intact.      Conjunctiva/sclera: Conjunctivae normal.      Pupils: Pupils are equal, round, and reactive to light.   Neck:      Thyroid: No thyroid mass, thyromegaly or thyroid tenderness.   Cardiovascular:      Rate and Rhythm: Normal rate and regular rhythm.      Pulses: Normal pulses.      Heart sounds: Normal heart sounds.   Pulmonary:      Effort: Pulmonary effort is normal.   Abdominal:      General: Bowel sounds are normal.      Palpations: Abdomen is soft.   Musculoskeletal:         General: Normal range of motion.      Cervical back: Normal range of motion and neck supple.   Lymphadenopathy:      Cervical: No cervical adenopathy.   Skin:     General: Skin is warm and dry.   Neurological:      General: No focal deficit present.      Mental Status: She is alert.   Psychiatric:         Mood and Affect: Mood normal.         Behavior: Behavior normal.         Thought Content: Thought content normal.         Judgment: Judgment normal.         Labs:    TSH  No results found for: TSHBASE     Free T4  No results found for: FREET4    T3  No results found for: W6ERMBB      TPO  No results found for: THYROIDAB    TG AB  No results found for: THGAB    TG  No results found for: THYROGLB    CBC w/DIFF  Lab Results   Component Value Date    WBC 5.9 04/15/2022    RBC 4.66 04/15/2022    HGB 13.2 04/15/2022    HCT 40.2 04/15/2022    MCV 86 04/15/2022    MCH 28.3 04/15/2022    MCHC 32.8 04/15/2022    RDW 14.0 04/15/2022    RDWSD 49.3 08/19/2016    MPV 8.0 02/10/2017     04/15/2022    NEUTRORELPCT 60 04/15/2022    LYMPHORELPCT 30 04/15/2022    MONORELPCT 7 04/15/2022    EOSRELPCT 2 04/15/2022    " BASORELPCT 0 04/15/2022    AUTOIGPER 0.0 08/19/2016    NEUTROABS 3.6 04/15/2022    LYMPHSABS 1.8 04/15/2022    MONOSABS 0.4 04/15/2022    EOSABS 0.1 04/15/2022    BASOSABS 0.0 04/15/2022    AUTOIGNUM 0.00 08/19/2016           Assessment / Plan      Assessment & Plan:  Diagnoses and all orders for this visit:    1. Multiple thyroid nodules (Primary)  Assessment & Plan:  She has multiple thyroid nodules that all appear to be <5mm in size.  These are too small to attempt FNA and too small to cause any symptoms.  She has been euthyroid.  We discussed continued observation.  Consider another neck u/s in about a year.         Return in about 9 months (around 5/30/2023) for Recheck with TSH, neck u/s.    Walt Jarvis MD   08/30/2022

## 2022-09-19 DIAGNOSIS — F98.8 ATTENTION DEFICIT DISORDER (ADD) WITHOUT HYPERACTIVITY: ICD-10-CM

## 2022-09-19 RX ORDER — DEXTROAMPHETAMINE SACCHARATE, AMPHETAMINE ASPARTATE MONOHYDRATE, DEXTROAMPHETAMINE SULFATE AND AMPHETAMINE SULFATE 7.5; 7.5; 7.5; 7.5 MG/1; MG/1; MG/1; MG/1
30 CAPSULE, EXTENDED RELEASE ORAL EVERY MORNING
Qty: 30 CAPSULE | Refills: 0 | Status: SHIPPED | OUTPATIENT
Start: 2022-09-19 | End: 2022-10-20 | Stop reason: SDUPTHER

## 2022-10-05 ENCOUNTER — OFFICE VISIT (OUTPATIENT)
Dept: FAMILY MEDICINE CLINIC | Facility: CLINIC | Age: 42
End: 2022-10-05

## 2022-10-05 VITALS
RESPIRATION RATE: 16 BRPM | HEIGHT: 70 IN | OXYGEN SATURATION: 100 % | SYSTOLIC BLOOD PRESSURE: 122 MMHG | HEART RATE: 62 BPM | WEIGHT: 216 LBS | TEMPERATURE: 98.4 F | DIASTOLIC BLOOD PRESSURE: 78 MMHG | BODY MASS INDEX: 30.92 KG/M2

## 2022-10-05 DIAGNOSIS — G43.009 MIGRAINE WITHOUT AURA AND WITHOUT STATUS MIGRAINOSUS, NOT INTRACTABLE: ICD-10-CM

## 2022-10-05 DIAGNOSIS — F98.8 ATTENTION DEFICIT DISORDER (ADD) WITHOUT HYPERACTIVITY: Primary | ICD-10-CM

## 2022-10-05 DIAGNOSIS — Z12.31 ENCOUNTER FOR SCREENING MAMMOGRAM FOR MALIGNANT NEOPLASM OF BREAST: ICD-10-CM

## 2022-10-05 PROCEDURE — 99214 OFFICE O/P EST MOD 30 MIN: CPT | Performed by: FAMILY MEDICINE

## 2022-10-05 RX ORDER — RIMEGEPANT SULFATE 75 MG/75MG
1 TABLET, ORALLY DISINTEGRATING ORAL DAILY PRN
Qty: 4 TABLET | Refills: 0 | COMMUNITY
Start: 2022-10-05 | End: 2022-11-27 | Stop reason: SDUPTHER

## 2022-10-05 NOTE — PROGRESS NOTES
"Chief Complaint  6mo f/u ADD     Subjective          Elisabeth Bustos presents to [facility]  History of Present Illness  ADD follow up  Chronic condition, currently treated with Adderall XR 30 mg daily.   Doing well with treatment and dose . Able to concentrate easier with treatment, complete tasks in timely manner.   Reports no side effect or concern regarding medication.  No issues with sleep   No chest pain, palpitations, increased headaches, sleep disturbance, or appetite changes.    She has had migraines for years, started during childhood.  Previously had ocular migraines, but no longer has vision changes associated with migraines.   Migraines are infrequently   Treating migraines with ibuprofen and tylenol.     The following portions of the patient's history were reviewed and updated as appropriate: allergies, current medications, past family history, past medical history, past social history, past surgical history and problem list.    Objective   Vital Signs:   /78   Pulse 62   Temp 98.4 °F (36.9 °C)   Resp 16   Ht 177.8 cm (70\")   Wt 98 kg (216 lb)   SpO2 100%   BMI 30.99 kg/m²     Physical Exam  Vitals and nursing note reviewed.   Constitutional:       Appearance: Normal appearance. She is well-developed.   HENT:      Head: Normocephalic and atraumatic.      Right Ear: External ear normal.      Left Ear: External ear normal.   Eyes:      Conjunctiva/sclera: Conjunctivae normal.   Cardiovascular:      Rate and Rhythm: Normal rate and regular rhythm.      Heart sounds: Normal heart sounds.   Pulmonary:      Effort: Pulmonary effort is normal.      Breath sounds: Normal breath sounds.   Musculoskeletal:         General: No deformity.      Cervical back: Neck supple.   Skin:     General: Skin is warm.   Neurological:      Mental Status: She is alert and oriented to person, place, and time.   Psychiatric:         Mood and Affect: Mood normal.         " Behavior: Behavior normal.        Result Review :                 Assessment and Plan    Diagnoses and all orders for this visit:    1. Attention deficit disorder (ADD) without hyperactivity (Primary)  -     Compliance Drug Analysis, Ur - Urine, Clean Catch    2. Migraine without aura and without status migrainosus, not intractable  -     Rimegepant Sulfate (Nurtec) 75 MG tablet dispersible tablet; Take 1 tablet by mouth Daily As Needed (migraine).  Dispense: 4 tablet; Refill: 0    3. Encounter for screening mammogram for malignant neoplasm of breast  -     Mammo Screening Digital Tomosynthesis Bilateral With CAD; Future    ADD well controlled with Adderall, no changes to treatment.   UDS collected today  Sample of Nurtec provided to treat migraines.       Follow Up   Return in about 6 months (around 4/5/2023) for Recheck.  Patient was given instructions and counseling regarding her condition or for health maintenance advice. Please see specific information pulled into the AVS if appropriate.

## 2022-10-12 LAB — DRUGS UR: NORMAL

## 2022-10-20 DIAGNOSIS — F98.8 ATTENTION DEFICIT DISORDER (ADD) WITHOUT HYPERACTIVITY: ICD-10-CM

## 2022-10-21 RX ORDER — DEXTROAMPHETAMINE SACCHARATE, AMPHETAMINE ASPARTATE MONOHYDRATE, DEXTROAMPHETAMINE SULFATE AND AMPHETAMINE SULFATE 7.5; 7.5; 7.5; 7.5 MG/1; MG/1; MG/1; MG/1
30 CAPSULE, EXTENDED RELEASE ORAL EVERY MORNING
Qty: 30 CAPSULE | Refills: 0 | Status: SHIPPED | OUTPATIENT
Start: 2022-10-21 | End: 2022-11-27 | Stop reason: SDUPTHER

## 2022-10-24 ENCOUNTER — OFFICE VISIT (OUTPATIENT)
Dept: OBSTETRICS AND GYNECOLOGY | Facility: CLINIC | Age: 42
End: 2022-10-24

## 2022-10-24 VITALS
WEIGHT: 215.6 LBS | HEIGHT: 70 IN | SYSTOLIC BLOOD PRESSURE: 148 MMHG | DIASTOLIC BLOOD PRESSURE: 88 MMHG | BODY MASS INDEX: 30.86 KG/M2

## 2022-10-24 DIAGNOSIS — Z86.711 HISTORY OF PULMONARY EMBOLISM: ICD-10-CM

## 2022-10-24 DIAGNOSIS — Z01.419 WOMEN'S ANNUAL ROUTINE GYNECOLOGICAL EXAMINATION: Primary | ICD-10-CM

## 2022-10-24 DIAGNOSIS — N92.0 MENORRHAGIA WITH REGULAR CYCLE: ICD-10-CM

## 2022-10-24 DIAGNOSIS — Z11.3 SCREENING FOR STD (SEXUALLY TRANSMITTED DISEASE): ICD-10-CM

## 2022-10-24 DIAGNOSIS — Z30.2 ENCOUNTER FOR STERILIZATION: ICD-10-CM

## 2022-10-24 DIAGNOSIS — Z86.718 HISTORY OF DEEP VENOUS THROMBOSIS: ICD-10-CM

## 2022-10-24 PROCEDURE — 99213 OFFICE O/P EST LOW 20 MIN: CPT | Performed by: OBSTETRICS & GYNECOLOGY

## 2022-10-24 PROCEDURE — 99386 PREV VISIT NEW AGE 40-64: CPT | Performed by: OBSTETRICS & GYNECOLOGY

## 2022-10-24 NOTE — PROGRESS NOTES
"     Gynecologic Annual Exam Note      CC- Here for annual exam    Subjective     Elisabeth Bustos is a 42 y.o. female previous patient of the practice re-establishing care, and new to me who presents for annual well woman exam. Patient's last menstrual period was 10/15/2022 (exact date). Her periods are regular every 28-30 days, lasting 3-7 days and are heavy and clots are present. She reports severe dysmenorrhea. Partner Status: Marital Status: single. New Partners since last visit: no. Desires STD Screening: yes. Current contraception: IUD Paraguard - inserted 2016. The patient is happy with her current contraceptive method. Patient would like to discuss plans for future contraception. Patient has a history of DVT/PE in 2016 while on POP.  Patient was placed on blood thinners for 2 years.    Pt does not desire future fertility.    Patient also reports that \"there is something blocking her bowel movements that she has to push back in afterwards.\" Patient reports that the area hurts, and she has invested in a \"bidet\" and report that helps. Patient reports this area has been there for years.     She exercises regularly: no.  She has concerns about domestic violence: no.      OB History        1    Para   1    Term   1       0    AB   0    Living   1       SAB   0    IAB   0    Ectopic   0    Molar   0    Multiple   0    Live Births   1              Performs monthly Self-Breast Exam: Yes   History of abnormal Pap smear: yes ~. Patient reports atypical cells, but otherwise normal.   Family history of uterine, colon or ovarian cancer: no  Family history of breast cancer: no  History of abnormal mammogram: no  Feelings of Anxiety or Depression: no  Tobacco Usage?: No       Last Pap : ~5 years ago. Normal per patient.   Last Completed Pap Smear     This patient has no relevant Health Maintenance data.        Last mammogram: Never   Last Completed Mammogram     This patient has no relevant Health " Maintenance data.        Last colonoscopy: Never   Last Completed Colonoscopy     This patient has no relevant Health Maintenance data.        The following portions of the patient's history were reviewed and updated as appropriate: allergies, current medications, past family history, past medical history, past social history and past surgical history.    Past Medical History:   Diagnosis Date   • Abnormal Pap smear of cervix ?    Patient reports atypical cells, but otherwise normal   • Acid reflux    • Anemia Lifetime   • Anxiety    • Bilateral pulmonary emboli     Significantly greater on the right than the left.Echocardiogram 2016, revealing an LVEF of 50% to 55% with a dilated RV and reduced function noted.Trace AI,mild MR, moderate TR with no RVSP of 60-65 mmHg. catheter placement to the right pulmonary artery 2016, x 24 hours.Her hypercoagulable workup was negative.Oral contraceptive use.       • DVT (deep venous thrombosis) ([facility])    • H/O echocardiogram 2016    The RV appears less dilated when compared to echo 16. The right ventricular systolic pressure 57 mmHg   • Hypercoagulable state ([facility])     Negative hypercoagulable profile.    • Hypertension    • Migraine headache    • Multiple thyroid nodules 2022   • Oral contraceptive use     Contraceptive use on discontinued   • Thyroid nodule    • Trauma    • Varicella Child       Past Surgical History:   Procedure Laterality Date   • BREAST AUGMENTATION     •  SECTION     • CHOLECYSTECTOMY  2015   • HERNIA REPAIR Bilateral     inguinal hernia - age 2   • INGUINAL HERNIA REPAIR      age 13- unknown side. Mesh was placed.   • LAPAROSCOPIC CHOLECYSTECTOMY  ?   • LARYNGOSCOPY      Laser laryngoscopy. Hyoid trim   • PLANTAR FASCIA SURGERY Left    • WISDOM TOOTH EXTRACTION         Review of Systems  Review of Systems    Objective     /88 (BP Location: Left arm, Patient Position: Sitting, Cuff Size: Adult)   " Ht 177.8 cm (70\")   Wt 97.8 kg (215 lb 9.6 oz)   LMP 10/15/2022 (Exact Date)   BMI 30.94 kg/m²       Physical Exam  Vitals and nursing note reviewed. Exam conducted with a chaperone present.   Constitutional:       General: She is awake.   HENT:      Head: Normocephalic and atraumatic.   Neck:      Thyroid: No thyroid mass, thyromegaly or thyroid tenderness.   Cardiovascular:      Rate and Rhythm: Normal rate.      Heart sounds: No murmur heard.    No friction rub. No gallop.   Pulmonary:      Effort: Pulmonary effort is normal.      Breath sounds: Normal breath sounds. No stridor. No wheezing, rhonchi or rales.   Chest:      Chest wall: No mass or tenderness.   Breasts:     Right: Normal. No bleeding, inverted nipple, mass, nipple discharge, skin change or tenderness.      Left: Normal. No bleeding, inverted nipple, mass, nipple discharge, skin change or tenderness.   Abdominal:      Palpations: Abdomen is soft.      Tenderness: There is no guarding or rebound.      Hernia: There is no hernia in the left inguinal area or right inguinal area.   Genitourinary:     General: Normal vulva.      Pubic Area: No rash.       Labia:         Right: No rash, tenderness, lesion or injury.         Left: No rash, tenderness, lesion or injury.       Urethra: No prolapse, urethral swelling or urethral lesion.      Vagina: No foreign body. No vaginal discharge, erythema, tenderness, bleeding or lesions.      Cervix: No cervical motion tenderness, discharge, friability, lesion, erythema or cervical bleeding.      Uterus: Normal. Not deviated, not enlarged, not fixed and not tender.       Adnexa: Right adnexa normal and left adnexa normal.        Right: No mass, tenderness or fullness.          Left: No mass, tenderness or fullness.        Rectum: No external hemorrhoid.      Comments: iud string present, foreign body attached to tip of string, string trimmed today and foreign body removed  Musculoskeletal:      Cervical back: " Normal range of motion.   Lymphadenopathy:      Upper Body:      Right upper body: No supraclavicular or axillary adenopathy.      Left upper body: No supraclavicular or axillary adenopathy.   Skin:     General: Skin is warm.   Neurological:      Mental Status: She is alert and oriented to person, place, and time.   Psychiatric:         Mood and Affect: Mood and affect normal.         Speech: Speech normal.         Assessment     Assessment     Problem List Items Addressed This Visit        Coag and Thromboembolic    History of pulmonary embolism    History of deep venous thrombosis    Overview     Right lower extremity DVT, above and below the knee.On Xarelto 20 mg p.o. Daily, started 01/06/2016        Other Visit Diagnoses     Women's annual routine gynecological examination    -  Primary    Relevant Orders    LIQUID-BASED PAP SMEAR, [facility] ([facility])    Screening for STD (sexually transmitted disease)        Relevant Orders    LIQUID-BASED PAP SMEAR, [facility] ([facility])    Menorrhagia with regular cycle        Relevant Orders    US Non-ob Transvaginal    Encounter for sterilization                Plan     Reviewed monthly self breast exams.  Instructed to call with lumps, pain, or breast discharge.    RTC in 1 year or PRN with problems.  Already has mammogram order from PCP in system, plans to schedule  Patient presents today to discuss options for contraception.  Currently she has a ParaGard IUD in place and has had this since 2016.  Since placement her periods have become extremely heavy.  Over the years they have slightly improved compared to what they were initially after insertion.  She reports regular cycles that are q. 21 days but very heavy.  Given her history of thromboembolic disease while on a progesterone only pill, her options are limited.  She has seen Dr. Mix for evaluation and a work-up for hypercoagulable disorders was negative.  These labs were reviewed with the patient today.  She was  anticoagulated for approximately 2 years, but has since discontinued this.  She has had no further thromboembolic events.  The patient is interested in permanent surgical sterilization and states that she does not desire future fertility.  We discussed surgical options and she would like to proceed with laparoscopic bilateral salpingectomy.  We did discuss routine operative risks including bleeding, infection, damage to surrounding structures and we also reviewed that surgery is a risk for thromboembolic events.  We discussed the use of SCDS intraoperatively as well as a course of Lovenox x2 weeks postoperatively.  She has not been seen by her cardiologist for some time, so she will call to schedule a checkup and a preoperative clearance.  Patient with menorrhagia with cycles q. 21 days.  This problem has been present since the ParaGard was placed.  We discussed that this was likely secondary to the ParaGard, but as she is interested in proceeding with an endometrial ablation.  She will return for transvaginal ultrasound and endometrial biopsy.  The surgery was discussed with the patient today.      Lissette Quintanilla MD  10/24/2022

## 2022-10-26 ENCOUNTER — OFFICE VISIT (OUTPATIENT)
Dept: CARDIOLOGY | Facility: CLINIC | Age: 42
End: 2022-10-26

## 2022-10-26 VITALS
BODY MASS INDEX: 30.86 KG/M2 | OXYGEN SATURATION: 99 % | HEIGHT: 70 IN | DIASTOLIC BLOOD PRESSURE: 72 MMHG | SYSTOLIC BLOOD PRESSURE: 118 MMHG | HEART RATE: 68 BPM | WEIGHT: 215.6 LBS

## 2022-10-26 DIAGNOSIS — I26.99 BILATERAL PULMONARY EMBOLISM: Primary | ICD-10-CM

## 2022-10-26 PROCEDURE — 99204 OFFICE O/P NEW MOD 45 MIN: CPT | Performed by: PHYSICIAN ASSISTANT

## 2022-10-26 PROCEDURE — 93000 ELECTROCARDIOGRAM COMPLETE: CPT | Performed by: PHYSICIAN ASSISTANT

## 2022-10-26 NOTE — PROGRESS NOTES
[facility]   OFFICE NOTE      Elisabeth Bustos  1980  PCP: Mariann Vega DO    SUBJECTIVE:   Elisabeth Bustos is a 42 y.o. female seen for a follow up visit regarding the following:     CC:Bilateral PE    HPI:   Pleasant 42-year-old female returns to our office after 5-year hiatus regarding remote DVT, unprovoked bilateral pulmonary emboli.  The patient states she was on Xarelto chronically but in 2020 she lost her insurance she no longer is taking it.  She is also has had a very great difficult time with abnormal uterine bleeding and she required surgical revision in near future.  She states she has not had any chest pain or chest tightness or worsening shortness of breath.  She has had no recurrent DVT type symptoms.  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  She is had no heart failure symptoms orthopnea or peripheral edema admission denies any palpitations.  She has not seen Dr. Mix since her last visit over 5 years ago.  She states she did have surgery on her left foot compression release for severe Planter fasciitis a few years ago.  She did well with the surgical procedure with no complications.  She is here today because her surgeon who is performing uterine ablation requests a preop evaluation.    Cardiac PMH: (Old records have been reviewed and summarized below)  1. Bilateral pulmonary emboli.  a. Significantly greater on the right than the left.  b. Echocardiogram 01/04/2016, revealing an LVEF  of 50% to 55% with a dilated RV and reduced function noted.  Trace AI, mild MR, moderate TR with no RVSP of 60-65 mmHg.    c. Cardiac catheterization, 01/04/2016: Successful EKOS placement to the right pulmonary artery, x24 hours.  d. 05/21/2016, TTE: EF < 50%.  e. Her hypercoagulable workup was negative.   f. Limited echo, 07/27/2016: RVSP <30. Normal RV size and function.   g. Family history of pulmonary  "emboli in father and maternal grandmother, DVT in mother and paternal grandmother.   2. Oral contraceptives use at time of pulmonary emboli.   3. Migraine headache.   4. Exercise-induced asthma.  5.  Past surgical history:   a. Bilateral hernia repair.   b.   section.   c. Laser laryngoscopy.   d. Breast augmentation.   e. Cholecystectomy, 2015.       Past Medical History, Past Surgical History, Family history, Social History, and Medications were all reviewed with the patient today and updated as necessary.       Current Outpatient Medications:   •  amphetamine-dextroamphetamine XR (ADDERALL XR) 30 MG 24 hr capsule, Take 1 capsule by mouth Every Morning, Disp: 30 capsule, Rfl: 0  •  docusate sodium (Colace) 100 MG capsule, Take 1 capsule by mouth 2 (Two) Times a Day As Needed for Constipation., Disp: 60 capsule, Rfl: 5  •  polyethylene glycol (MiraLax) 17 GM/SCOOP powder, Take 17 g by mouth Daily As Needed (constipation)., Disp: 289 g, Rfl: 5  •  Rimegepant Sulfate (Nurtec) 75 MG tablet dispersible tablet, Take 1 tablet by mouth Daily As Needed (migraine)., Disp: 4 tablet, Rfl: 0      No Known Allergies      PHYSICAL EXAM:    /72 (BP Location: Right arm, Patient Position: Sitting)   Pulse 68   Ht 177.8 cm (70\")   Wt 97.8 kg (215 lb 9.6 oz)   LMP 10/15/2022 (Exact Date)   SpO2 99%   BMI 30.94 kg/m²        Wt Readings from Last 5 Encounters:   10/26/22 97.8 kg (215 lb 9.6 oz)   10/24/22 97.8 kg (215 lb 9.6 oz)   10/05/22 98 kg (216 lb)   22 97.1 kg (214 lb)   04/15/22 105 kg (231 lb)       BP Readings from Last 5 Encounters:   10/26/22 118/72   10/24/22 148/88   10/05/22 122/78   22 120/70   04/15/22 122/84       General appearance - Alert, well appearing, and in no distress   Mental status - Affect appropriate to mood.  Eyes - Sclerae anicteric,  ENMT - Hearing grossly normal bilaterally, Dental hygiene good.  Neck - Carotids upstroke normal bilaterally, no bruits, no JVD.  Resp " - Clear to auscultation, no wheezes, rales or rhonchi, symmetric air entry.  Heart - Normal rate, regular rhythm, normal S1, S2, no murmurs, rubs, clicks or gallops.  GI - Soft, nontender, nondistended, no masses or organomegaly.  Neurological - Grossly intact - normal speech, no focal findings  Musculoskeletal - No joint tenderness, deformity or swelling, no muscular tenderness noted.  Extremities - Peripheral pulses normal, no pedal edema, no clubbing or cyanosis.  Skin - Normal coloration and turgor.  Psych -  oriented to person, place, and time.    Medical problems and test results were reviewed with the patient today.     Recent Results (from the past 672 hour(s))   Compliance Drug Analysis, Ur - Urine, Clean Catch    Collection Time: 10/05/22  1:56 AM    Specimen: Urine, Clean Catch   Result Value Ref Range    Report Summary FINAL          EKG: (EKG has been independently visualized by me and summarized below)    ECG 12 Lead    Date/Time: 10/26/2022 9:57 AM  Performed by: Dangelo Alvarez PA  Authorized by: Dangelo Alvarez PA   Rhythm: sinus rhythm  Rate: normal  Conduction: conduction normal  ST Segments: ST segments normal  T Waves: T waves normal  QRS axis: normal  Other: no other findings    Clinical impression: normal ECG            ASSESSMENT   1. Bilateral PE's, unprovoked while on birth control remote EKOS therapy.  Follow-up echocardiogram 2016 normal LV function RV not dilated.  2.  Hypercoagulability work-up was negative with Dr. Mix  3.  Recent abnormal uterine bleeding plan for upcoming surgical revision, tubal ligation    PLAN  · EKG today is normal, patient does not have any any cardiac symptoms.  She does have unprovoked bilateral PEs occurred in 2016 while birth control.  From cardiovascular point she is considered acceptable, low risk to pursue uterine ablation, tubal ligation.  · We did discuss long-term if she chooses to reinitiate Xarelto therapy as she now has insurance because  her long-term risk of recurrent PE is unknown she may consider this in the future.  · She return to follow-up Dr. Balwinder Green in 1 year sooner if any change in symptoms      10/26/2022  09:57 EDT  Will Kim PAGN

## 2022-10-27 LAB — REF LAB TEST METHOD: NORMAL

## 2022-10-28 ENCOUNTER — PATIENT ROUNDING (BHMG ONLY) (OUTPATIENT)
Dept: OBSTETRICS AND GYNECOLOGY | Facility: CLINIC | Age: 42
End: 2022-10-28

## 2022-10-31 NOTE — PROGRESS NOTES
Please call patient about results.  Normal pap, neg std screen.  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  This can be seen with IUDs.  As long as she doesn't have pelvic pain, then nothing to do.  (We didn't have complaints of chronic pain in her recent note).  This species can be associated with chronic pelvic pain, but some people are more like carriers.

## 2022-11-08 ENCOUNTER — TELEPHONE (OUTPATIENT)
Dept: OBSTETRICS AND GYNECOLOGY | Facility: CLINIC | Age: 42
End: 2022-11-08

## 2022-11-08 NOTE — TELEPHONE ENCOUNTER
Pt returned call, discussed her pelvic pain. She denies that it is chronic. Told pt if she develops constant pelvic pain or has any additional concerns to call our office. Pt verbalizes understanding.

## 2022-11-21 ENCOUNTER — OFFICE VISIT (OUTPATIENT)
Dept: OBSTETRICS AND GYNECOLOGY | Facility: CLINIC | Age: 42
End: 2022-11-21

## 2022-11-21 VITALS
BODY MASS INDEX: 32.16 KG/M2 | WEIGHT: 224.6 LBS | DIASTOLIC BLOOD PRESSURE: 72 MMHG | SYSTOLIC BLOOD PRESSURE: 128 MMHG | HEIGHT: 70 IN

## 2022-11-21 DIAGNOSIS — N92.0 MENORRHAGIA WITH REGULAR CYCLE: ICD-10-CM

## 2022-11-21 DIAGNOSIS — Z76.89 ENCOUNTER FOR BIOPSY: Primary | ICD-10-CM

## 2022-11-21 LAB
B-HCG UR QL: NEGATIVE
EXPIRATION DATE: NORMAL
INTERNAL NEGATIVE CONTROL: NEGATIVE
INTERNAL POSITIVE CONTROL: POSITIVE
Lab: NORMAL

## 2022-11-21 PROCEDURE — 81025 URINE PREGNANCY TEST: CPT | Performed by: OBSTETRICS & GYNECOLOGY

## 2022-11-21 PROCEDURE — 58100 BIOPSY OF UTERUS LINING: CPT | Performed by: OBSTETRICS & GYNECOLOGY

## 2022-11-21 NOTE — PROGRESS NOTES
Gynecologic Procedure Note        Endometrial Biopsy      Indications: The pt is a 42 y.o. , who presents today for endometrial biopsy.  The patient was noted to have Menorrhagia.  Her LMP is Patient's last menstrual period was 2022 (exact date). .    After being presented with the risk, benefits, and specific detail of the procedure, the patient wished to proceed.  Written consent was obtained from patient.   Urine pregnancy test was Negative.      Procedure Details   The patient was placed on the table in the dorsal lithotomy position.  She was draped in the appropriate manner.  A speculum was placed in the vagina.  The cervix was visualized and prepped with Betadine.  A tenaculum was not placed on the anterior lip of the cervix for traction.  A small plastic 5 mm Pipelle syringe curette was inserted into the cervical canal.  The uterus was sounded to 7 cm's.  A vigorous four quadrant biopsy was performed, removing a small amount of tissue.  The tissue was placed in Formalin and sent to Pathology.  The patient tolerated the procedure very well and she reported mild cramping.  The tenaculum was removed from the cervix and the speculum was removed.  The patient was observed for 5 minutes.           Complications: none.        Lissette Quintanilla MD  2022

## 2022-11-22 LAB — REF LAB TEST METHOD: NORMAL

## 2022-11-27 DIAGNOSIS — F98.8 ATTENTION DEFICIT DISORDER (ADD) WITHOUT HYPERACTIVITY: ICD-10-CM

## 2022-11-27 DIAGNOSIS — G43.009 MIGRAINE WITHOUT AURA AND WITHOUT STATUS MIGRAINOSUS, NOT INTRACTABLE: ICD-10-CM

## 2022-11-28 ENCOUNTER — TELEPHONE (OUTPATIENT)
Dept: OBSTETRICS AND GYNECOLOGY | Facility: CLINIC | Age: 42
End: 2022-11-28

## 2022-11-28 RX ORDER — RIMEGEPANT SULFATE 75 MG/75MG
1 TABLET, ORALLY DISINTEGRATING ORAL DAILY PRN
Qty: 16 TABLET | Refills: 2 | Status: SHIPPED | OUTPATIENT
Start: 2022-11-28 | End: 2023-03-06

## 2022-11-28 RX ORDER — DEXTROAMPHETAMINE SACCHARATE, AMPHETAMINE ASPARTATE MONOHYDRATE, DEXTROAMPHETAMINE SULFATE AND AMPHETAMINE SULFATE 7.5; 7.5; 7.5; 7.5 MG/1; MG/1; MG/1; MG/1
30 CAPSULE, EXTENDED RELEASE ORAL EVERY MORNING
Qty: 30 CAPSULE | Refills: 0 | Status: SHIPPED | OUTPATIENT
Start: 2022-11-28 | End: 2022-12-05 | Stop reason: DRUGHIGH

## 2022-11-28 NOTE — TELEPHONE ENCOUNTER
Attempted to call patient regarding negative EMB results. Patient also needs to come in just to sign [facility] sterilization form and [facility] consent for lap bilateral salpingectomy so that we can get surgery arranged per . Message left for callback.        -TANIYA Latham

## 2022-11-28 NOTE — TELEPHONE ENCOUNTER
Attempted to call patient per Dr. Quintanilla request, CB.    Per Dr Quintanilla:  This pt wants to schedule surgery for sterilization, but we just realized she has medicaid.  Can you call her and have her just come by to sign the [facility] sterilization form and the [facility] sterilization form.  She cannot have surgery for 30 days after signing per her insurance.        -TANIYA Latham

## 2022-11-28 NOTE — PROGRESS NOTES
Please call patient about results.  EMB negative.  This is the pt you were calling about signing the sterilization forms.

## 2022-11-28 NOTE — TELEPHONE ENCOUNTER
----- Message from Lissette Quintanilla MD sent at 11/28/2022  2:32 PM EST -----  Please call patient about results.  EMB negative.  This is the pt you were calling about signing the sterilization forms.

## 2022-12-04 ENCOUNTER — PATIENT MESSAGE (OUTPATIENT)
Dept: FAMILY MEDICINE CLINIC | Facility: CLINIC | Age: 42
End: 2022-12-04

## 2022-12-05 DIAGNOSIS — F98.8 ATTENTION DEFICIT DISORDER (ADD) WITHOUT HYPERACTIVITY: ICD-10-CM

## 2022-12-05 RX ORDER — DEXTROAMPHETAMINE SACCHARATE, AMPHETAMINE ASPARTATE MONOHYDRATE, DEXTROAMPHETAMINE SULFATE AND AMPHETAMINE SULFATE 5; 5; 5; 5 MG/1; MG/1; MG/1; MG/1
20 CAPSULE, EXTENDED RELEASE ORAL EVERY MORNING
Qty: 30 CAPSULE | Refills: 0 | Status: SHIPPED | OUTPATIENT
Start: 2022-12-05 | End: 2023-01-17 | Stop reason: SDUPTHER

## 2022-12-05 NOTE — TELEPHONE ENCOUNTER
From: Elisabeth Bustos  To: Mariann Vega DO  Sent: 12/4/2022 10:38 PM EST  Subject: Adderall 30mg    I have not picked up my prescription yet because I was wondering could we possibly change to the lower dosage? I believe it was the 20mg? I would like try to step down my dosage please and see how I can do. Please  Let me know if this is possible. Thank  You

## 2022-12-06 NOTE — TELEPHONE ENCOUNTER
Elisabeth Bustos (Crao: [id number]) - [id number]  Adderall XR 20MG er capsules   Status: PA Request  Created: December 6th, 2022 [phone]  Sent: December 6th, 2022    Elisabeth Bustos (Caro: [id number]) - [id number]  Nurtec 75MG dispersible tablets  Status: PA Request  Created: November 29th, 2022 [phone]  Sent: December 6th, 2022

## 2022-12-15 ENCOUNTER — OFFICE VISIT (OUTPATIENT)
Dept: FAMILY MEDICINE CLINIC | Facility: CLINIC | Age: 42
End: 2022-12-15

## 2022-12-15 VITALS
SYSTOLIC BLOOD PRESSURE: 120 MMHG | TEMPERATURE: 97.5 F | WEIGHT: 226 LBS | BODY MASS INDEX: 32.35 KG/M2 | RESPIRATION RATE: 18 BRPM | DIASTOLIC BLOOD PRESSURE: 78 MMHG | OXYGEN SATURATION: 96 % | HEIGHT: 70 IN | HEART RATE: 61 BPM

## 2022-12-15 DIAGNOSIS — H66.002 NON-RECURRENT ACUTE SUPPURATIVE OTITIS MEDIA OF LEFT EAR WITHOUT SPONTANEOUS RUPTURE OF TYMPANIC MEMBRANE: Primary | ICD-10-CM

## 2022-12-15 PROCEDURE — 99213 OFFICE O/P EST LOW 20 MIN: CPT | Performed by: FAMILY MEDICINE

## 2022-12-15 RX ORDER — AMOXICILLIN AND CLAVULANATE POTASSIUM 875; 125 MG/1; MG/1
1 TABLET, FILM COATED ORAL 2 TIMES DAILY
Qty: 20 TABLET | Refills: 0 | Status: SHIPPED | OUTPATIENT
Start: 2022-12-15 | End: 2023-03-06

## 2022-12-15 NOTE — PROGRESS NOTES
"Chief Complaint  Earache (Lt ear feels full x 4 days)    Subjective          Elisabeth Bustos presents to [facility]  History of Present Illness    Left ear pain  The patient presents today with complaints of left ear pain that began approximately 3 days ago. She denies any fever or chills. She reports that on Thanksgiving weekend, she had nasal congestion, sore throat, and lost her voice. She states that her tonsils began to swell until they were touching. She presented to urgent care and was tested for COVID-19, strep throat, and influenza. She was diagnosed with a bacterial sinus infection. She was prescribed Flonase, prednisone, and a cough syrup. She states that the cough syrup resolved the swelling and drainage in her lungs, but she continues to have a productive cough. She denies being prescribed an antibiotic. She reports that her nose is clear, but her chest is yellow in color. She denies smoking. She denies any allergies to antibiotics. She denies being on Xarelto. She states that she has a prescription for Xarelto, but she has a surgery coming up, so she is not taking it. She denies ever taking Augmentin.    Review of Systems   All other systems reviewed and are negative.       Objective       Vital Signs:   /78   Pulse 61   Temp 97.5 °F (36.4 °C)   Resp 18   Ht 177.8 cm (70\")   Wt 103 kg (226 lb)   SpO2 96%   BMI 32.43 kg/m²     Physical Exam  Vitals and nursing note reviewed.   Constitutional:       Appearance: She is well-developed.   HENT:      Head: Normocephalic and atraumatic.      Right Ear: Hearing, tympanic membrane, ear canal and external ear normal.      Left Ear: Hearing and external ear normal. Tympanic membrane is erythematous and bulging.   Eyes:      Conjunctiva/sclera: Conjunctivae normal.      Pupils: Pupils are equal, round, and reactive to light.   Cardiovascular:      Rate and Rhythm: Normal rate and regular rhythm.      Heart " sounds: Normal heart sounds. No murmur heard.    No friction rub.   Pulmonary:      Effort: Pulmonary effort is normal. No respiratory distress.      Breath sounds: Rhonchi present. No wheezing or rales.   Lymphadenopathy:      Cervical: No cervical adenopathy.   Skin:     General: Skin is warm.   Neurological:      Mental Status: She is alert.   Psychiatric:         Behavior: Behavior normal.          Result Review :                     Assessment and Plan    Diagnoses and all orders for this visit:    1. Non-recurrent acute suppurative otitis media of left ear without spontaneous rupture of tympanic membrane (Primary)  -     amoxicillin-clavulanate (Augmentin) 875-125 MG per tablet; Take 1 tablet by mouth 2 (Two) Times a Day.  Dispense: 20 tablet; Refill: 0          DISCUSSION    Left otitis media.  Augmentin 875 mg twice a day.  Side effects of diarrhea explained.  Call or follow-up if not improving or sooner if worsening.         Follow Up   Return if symptoms worsen or fail to improve.    Patient was given instructions and counseling regarding her condition or for health maintenance advice. Please see specific information pulled into the AVS if appropriate.       Remi Newman MD     Transcribed from ambient dictation for Remi Newman MD by Mary Bledsoe.  12/15/22   12:05 EST    Patient or patient representative verbalized consent to the visit recording.  I have personally performed the services described in this document as transcribed by the above individual, and it is both accurate and complete.  Remi Newman MD  12/16/2022  14:42 EST

## 2023-01-17 DIAGNOSIS — F98.8 ATTENTION DEFICIT DISORDER (ADD) WITHOUT HYPERACTIVITY: ICD-10-CM

## 2023-01-18 RX ORDER — DEXTROAMPHETAMINE SACCHARATE, AMPHETAMINE ASPARTATE MONOHYDRATE, DEXTROAMPHETAMINE SULFATE AND AMPHETAMINE SULFATE 5; 5; 5; 5 MG/1; MG/1; MG/1; MG/1
20 CAPSULE, EXTENDED RELEASE ORAL EVERY MORNING
Qty: 30 CAPSULE | Refills: 0 | Status: SHIPPED | OUTPATIENT
Start: 2023-01-18 | End: 2023-01-19 | Stop reason: SDUPTHER

## 2023-01-19 DIAGNOSIS — F98.8 ATTENTION DEFICIT DISORDER (ADD) WITHOUT HYPERACTIVITY: ICD-10-CM

## 2023-01-19 RX ORDER — DEXTROAMPHETAMINE SACCHARATE, AMPHETAMINE ASPARTATE MONOHYDRATE, DEXTROAMPHETAMINE SULFATE AND AMPHETAMINE SULFATE 5; 5; 5; 5 MG/1; MG/1; MG/1; MG/1
20 CAPSULE, EXTENDED RELEASE ORAL EVERY MORNING
Qty: 30 CAPSULE | Refills: 0 | Status: SHIPPED | OUTPATIENT
Start: 2023-01-19 | End: 2023-03-06

## 2023-01-19 NOTE — TELEPHONE ENCOUNTER
Caller: Elisabeth Bustos    Relationship: Self    Best call back number: [phone]    Requested Prescriptions:   Requested Prescriptions     Pending Prescriptions Disp Refills   • amphetamine-dextroamphetamine XR (Adderall XR) 20 MG 24 hr capsule 30 capsule 0     Sig: Take 1 capsule by mouth Every Morning        Pharmacy where request should be sent: [id number] [address] [facility] [address]  - [phone] [facility] [phone] FX     Does the patient have less than a 3 day supply:  [x] Yes  [] No    Would you like a call back once the refill request has been completed: [x] Yes [] No    If the office needs to give you a call back, can they leave a voicemail: [x] Yes [] No    Poli North Rep   01/19/23 09:59 EST

## 2023-01-19 NOTE — TELEPHONE ENCOUNTER
Called to notify pt this was sent yest. She stated [facility] is out of stock. Requesting it be sent to [facility] listed.

## 2023-03-06 ENCOUNTER — OFFICE VISIT (OUTPATIENT)
Dept: OBSTETRICS AND GYNECOLOGY | Facility: CLINIC | Age: 43
End: 2023-03-06
Payer: MEDICAID

## 2023-03-06 VITALS
DIASTOLIC BLOOD PRESSURE: 78 MMHG | HEIGHT: 70 IN | BODY MASS INDEX: 33.84 KG/M2 | SYSTOLIC BLOOD PRESSURE: 128 MMHG | WEIGHT: 236.4 LBS

## 2023-03-06 DIAGNOSIS — Z30.2 ENCOUNTER FOR STERILIZATION: ICD-10-CM

## 2023-03-06 DIAGNOSIS — I26.99 BILATERAL PULMONARY EMBOLISM: ICD-10-CM

## 2023-03-06 DIAGNOSIS — N92.0 MENORRHAGIA WITH REGULAR CYCLE: Primary | ICD-10-CM

## 2023-03-06 DIAGNOSIS — Z86.718 HISTORY OF DEEP VENOUS THROMBOSIS: ICD-10-CM

## 2023-03-06 PROCEDURE — 99213 OFFICE O/P EST LOW 20 MIN: CPT | Performed by: OBSTETRICS & GYNECOLOGY

## 2023-03-06 RX ORDER — HYDROCODONE BITARTRATE AND ACETAMINOPHEN 5; 325 MG/1; MG/1
1-2 TABLET ORAL EVERY 4 HOURS PRN
Qty: 14 TABLET | Refills: 0 | Status: SHIPPED | OUTPATIENT
Start: 2023-03-06 | End: 2023-03-07

## 2023-03-06 RX ORDER — IBUPROFEN 800 MG/1
800 TABLET ORAL EVERY 8 HOURS PRN
Qty: 30 TABLET | Refills: 0 | Status: SHIPPED | OUTPATIENT
Start: 2023-03-06 | End: 2023-03-07

## 2023-03-06 NOTE — PROGRESS NOTES
Gynecologic Preoperative Exam Note        Subjective   Elisabeth Bustos is a 43 y.o. year old  who is scheduled for surgery due to menorrhagia and desire for permanent sterilization.  She is scheduled for a laparoscopic bilateral salpingectomy, hysteroscopy, dilation and curettage with endometrial ablation and IUD removal at [facility].  Her surgery is scheduled for tomorrow 3/7/23 at 0930 am.  Her LMP was 3/3/23.  Her birth control method is Paraguard,  Her BMI is 33.92.        She has reviewed the informational pamphlet on endometrial ablation, salpingectomy, laparoscopy.    She understands the risks of bleeding, infection, possible damage to other organ systems, including but not limited to the gastrointestinal tract and genitourinary tract.  She also understands the specific risks listed in the preop information (video, pamphlets, etc.).    She has reviewed and signed the preop consent form.    She has been instructed to have a light dinner the night before surgery, then nothing to eat or drink after midnight.  The day of surgery do not chew gum or smoke.  Remove all jewelry, nail polish, contact lenses prior to coming to the hospital.  Do not bring valuables or large sums of money with you. Patient was instructed on what time to arrive and where to check in, maps were given.  She was instructed that she will meet an Anesthesiologist and that an IV will be started to provide fluids and sedation.  The total time of procedure was discussed.  She was instructed that she will need a .      She has confirmed that she is not allergic to Latex.       Past Medical History:   Diagnosis Date   • DVT (deep venous thrombosis) ([facility])    • H/O echocardiogram 2016    The RV appears less dilated when compared to echo 16. The right ventricular systolic pressure 57 mmHg   • Multiple thyroid nodules 2022   • Abnormal Pap smear of cervix ?    Patient reports atypical cells, but otherwise  normal   • Acid reflux    • Anemia Lifetime   • Anxiety    • Bilateral pulmonary emboli     Significantly greater on the right than the left.Echocardiogram 2016, revealing an LVEF of 50% to 55% with a dilated RV and reduced function noted.Trace AI,mild MR, moderate TR with no RVSP of 60-65 mmHg. catheter placement to the right pulmonary artery 2016, x 24 hours.Her hypercoagulable workup was negative.Oral contraceptive use.       • Hypercoagulable state (HCC)     Negative hypercoagulable profile.    • Hypertension    • Migraine headache    • Oral contraceptive use     Contraceptive use on discontinued   • Thyroid nodule    • Trauma    • Varicella Child     Past Surgical History:   Procedure Laterality Date   • CHOLECYSTECTOMY  2015   • BREAST AUGMENTATION     •  SECTION     • HERNIA REPAIR Bilateral     inguinal hernia - age 2   • INGUINAL HERNIA REPAIR      age 13- unknown side. Mesh was placed.   • LAPAROSCOPIC CHOLECYSTECTOMY  ?   • LARYNGOSCOPY      Laser laryngoscopy. Hyoid trim   • PLANTAR FASCIA SURGERY Left    • WISDOM TOOTH EXTRACTION       OB History    Para Term  AB Living   1 1 1 0 0 1   SAB IAB Ectopic Molar Multiple Live Births   0 0 0 0 0 1      # Outcome Date GA Lbr Daron/2nd Weight Sex Delivery Anes PTL Lv   1 Term 98    M CS-Unspec   LEON     Social History     Tobacco Use   Smoking Status Former   • Packs/day: 1.50   • Years: 10.00   • Pack years: 15.00   • Types: Cigarettes   • Quit date: 2013   • Years since quitting: 10.1   Smokeless Tobacco Never     Social History     Substance and Sexual Activity   Alcohol Use No     Social History     Substance and Sexual Activity   Drug Use Not Currently   • Types: Marijuana    Comment: Early teens     Prior to Admission medications    Medication Sig Start Date End Date Taking? Authorizing Provider   amoxicillin-clavulanate (Augmentin) 875-125 MG per tablet Take 1 tablet by mouth 2 (Two) Times a Day.  "12/15/22   Remi Newman MD   amphetamine-dextroamphetamine XR (Adderall XR) 20 MG 24 hr capsule Take 1 capsule by mouth Every Morning 1/19/23   Mariann Vega DO   docusate sodium (Colace) 100 MG capsule Take 1 capsule by mouth 2 (Two) Times a Day As Needed for Constipation. 2/8/22   Mariann Vega DO   polyethylene glycol (MiraLax) 17 GM/SCOOP powder Take 17 g by mouth Daily As Needed (constipation). 2/8/22   Mariann Vega DO   Rimegepant Sulfate (Nurtec) 75 MG tablet dispersible tablet Take 1 tablet by mouth Daily As Needed (migraine). 11/28/22   Mariann Vega DO   rivaroxaban (Xarelto) 20 MG tablet Take 1 tablet by mouth Daily With Dinner. Do not start until cleared by surgery. 10/26/22   Dangelo Alvarez PA     No Known Allergies    Review of Systems      Objective   /78   Ht 177.8 cm (70\")   Wt 107 kg (236 lb 6.4 oz)   LMP 03/03/2023 (Within Days)   BMI 33.92 kg/m²   General: well developed; well nourished  no acute distress alert and oriented x 3   Heart: regular rate and rhythm, S1, S2 normal, no murmur, click, rub or gallop   Lungs: breathing is unlabored  clear to auscultation bilaterally   Abdomen: soft, non-tender; no masses   Pelvis: Not performed.          Assessment   Problems Addressed this Visit        Coag and Thromboembolic    Bilateral pulmonary embolism (HCC)    History of deep venous thrombosis   Other Visit Diagnoses     Menorrhagia with regular cycle    -  Primary    Relevant Medications    HYDROcodone-acetaminophen (NORCO) 5-325 MG per tablet    Encounter for sterilization        Relevant Medications    HYDROcodone-acetaminophen (NORCO) 5-325 MG per tablet      Diagnoses       Codes Comments    Menorrhagia with regular cycle    -  Primary ICD-10-CM: N92.0  ICD-9-CM: 626.2     Encounter for sterilization     ICD-10-CM: Z30.2  ICD-9-CM: V25.2     Bilateral pulmonary embolism (HCC)     ICD-10-CM: I26.99  ICD-9-CM: 415.19     History of deep venous thrombosis "     ICD-10-CM: Z86.718  ICD-9-CM: V12.51                Plan   We plan laparoscopic bilateral salpingectomy, removal of IUD, hysteroscopy, D&C and endometrial ablation.  Risks of surgery were reviewed with the patient including risks of bleeding, infection, damage to other organ systems including, but not limited to GI and  tracts (bowel, bladder, blood vessels, nerves) risks of Anesthesia, as well as the risk the surgery will not produce the desired results, possible need for additional surgery, death, risk of uterine perforation.  Ceasar has been obtained and reviewed   Pain Medication Consent Form has been signed.  A review regarding proper medication administration, impact on driving and working while medicated, the safety of use in pregnancy, the potential for overdose and the proper disposal and storage of controlled medications has been done with the patient.  Given her history of thromboembolic events, we discussed her increased risk for thromboembolic complications perioperatively.  She has recently seen cardiology.  She has an active prescription for Xarelto, however, she does not take this medication.  We discussed that we will use SCUDs intraoperatively and discuss resuming her Xarelto postoperatively to decrease her risk.  We will also encourage early and adequate ambulation perioperatively.          Lissette Quintanilla MD  3/6/2023

## 2023-03-07 ENCOUNTER — OUTSIDE FACILITY SERVICE (OUTPATIENT)
Dept: OBSTETRICS AND GYNECOLOGY | Facility: CLINIC | Age: 43
End: 2023-03-07
Payer: MEDICAID

## 2023-03-07 RX ORDER — HYDROCODONE BITARTRATE AND ACETAMINOPHEN 5; 325 MG/1; MG/1
1-2 TABLET ORAL EVERY 4 HOURS PRN
Qty: 14 TABLET | Refills: 0 | Status: SHIPPED | OUTPATIENT
Start: 2023-03-07 | End: 2023-03-12

## 2023-03-07 RX ORDER — IBUPROFEN 800 MG/1
800 TABLET ORAL EVERY 8 HOURS PRN
Qty: 30 TABLET | Refills: 0 | Status: SHIPPED | OUTPATIENT
Start: 2023-03-07

## 2023-03-21 ENCOUNTER — TELEPHONE (OUTPATIENT)
Dept: FAMILY MEDICINE CLINIC | Facility: CLINIC | Age: 43
End: 2023-03-21
Payer: MEDICAID

## 2023-03-21 DIAGNOSIS — F98.8 ATTENTION DEFICIT DISORDER (ADD) WITHOUT HYPERACTIVITY: Primary | ICD-10-CM

## 2023-03-21 RX ORDER — DEXTROAMPHETAMINE SACCHARATE, AMPHETAMINE ASPARTATE MONOHYDRATE, DEXTROAMPHETAMINE SULFATE AND AMPHETAMINE SULFATE 5; 5; 5; 5 MG/1; MG/1; MG/1; MG/1
20 CAPSULE, EXTENDED RELEASE ORAL EVERY MORNING
Qty: 30 CAPSULE | Refills: 0 | Status: SHIPPED | OUTPATIENT
Start: 2023-03-21 | End: 2023-04-05

## 2023-03-21 NOTE — TELEPHONE ENCOUNTER
Caller: Elisabeth Bustos    Relationship: Self    Best call back number: [phone]    Requested Prescriptions:     amphetamine-dextroamphetamine XR (Adderall XR) 20 MG 24 hr capsule      Pharmacy where request should be sent: [id number] [place] [id number] [address] [facility] [address] - [phone]  - [phone] FX     Last office visit with prescribing clinician: 10/5/2022   Last telemedicine visit with prescribing clinician: 4/5/2023   Next office visit with prescribing clinician: 4/5/2023       Does the patient have less than a 3 day supply:  [x] Yes  [] No    Would you like a call back once the refill request has been completed: [x] Yes [] No    If the office needs to give you a call back, can they leave a voicemail: [x] Yes [] No    Poli Pacheco Rep   03/21/23 14:26 EDT

## 2023-03-23 ENCOUNTER — TELEPHONE (OUTPATIENT)
Dept: FAMILY MEDICINE CLINIC | Facility: CLINIC | Age: 43
End: 2023-03-23
Payer: MEDICAID

## 2023-03-23 NOTE — TELEPHONE ENCOUNTER
Caller: Elisabeth Bustos    Relationship: Self    Best call back number: [phone]    What form or medical record are you requesting: PRIOR AUTHORIZATION FOR ADDERALL XR 20MG    Who is requesting this form or medical record from you: [facility]    How would you like to receive the form or medical records (pick-up, mail, fax): FAX    If fax, what is the fax number: [phone]    Timeframe paperwork needed: ASAP

## 2023-04-05 ENCOUNTER — OFFICE VISIT (OUTPATIENT)
Dept: FAMILY MEDICINE CLINIC | Facility: CLINIC | Age: 43
End: 2023-04-05
Payer: MEDICAID

## 2023-04-05 VITALS
SYSTOLIC BLOOD PRESSURE: 138 MMHG | TEMPERATURE: 97.7 F | RESPIRATION RATE: 14 BRPM | HEIGHT: 70 IN | HEART RATE: 70 BPM | OXYGEN SATURATION: 96 % | BODY MASS INDEX: 34.3 KG/M2 | WEIGHT: 239.6 LBS | DIASTOLIC BLOOD PRESSURE: 84 MMHG

## 2023-04-05 DIAGNOSIS — E66.9 OBESITY (BMI 30.0-34.9): ICD-10-CM

## 2023-04-05 DIAGNOSIS — F98.8 ATTENTION DEFICIT DISORDER (ADD) WITHOUT HYPERACTIVITY: Primary | ICD-10-CM

## 2023-04-05 DIAGNOSIS — Z11.59 ENCOUNTER FOR HEPATITIS C SCREENING TEST FOR LOW RISK PATIENT: ICD-10-CM

## 2023-04-05 DIAGNOSIS — B07.9 WART OF HAND: ICD-10-CM

## 2023-04-05 DIAGNOSIS — Z12.11 SCREENING FOR COLON CANCER: ICD-10-CM

## 2023-04-05 DIAGNOSIS — E04.2 MULTIPLE THYROID NODULES: ICD-10-CM

## 2023-04-05 PROCEDURE — 1159F MED LIST DOCD IN RCRD: CPT | Performed by: FAMILY MEDICINE

## 2023-04-05 PROCEDURE — 1160F RVW MEDS BY RX/DR IN RCRD: CPT | Performed by: FAMILY MEDICINE

## 2023-04-05 PROCEDURE — 99214 OFFICE O/P EST MOD 30 MIN: CPT | Performed by: FAMILY MEDICINE

## 2023-04-05 RX ORDER — PHENTERMINE HYDROCHLORIDE 37.5 MG/1
TABLET ORAL
Qty: 30 TABLET | Refills: 0 | Status: SHIPPED | OUTPATIENT
Start: 2023-04-05

## 2023-04-05 NOTE — PROGRESS NOTES
Chief Complaint  6 mo f/u (ADHD)    Subjective          Elisabeth Bustos presents to [facility]  History of Present Illness  She complains of a wart on her left hand, middle finger that has been present for at least 3 years. She notes she has attempted to freeze the lesion and was unsuccessful. She is left hand dominant. She notes the lesion is growing in size.     The patient is tolerating the Adderall well for treatment of ADHD.   She notes a normal appetite. She is sleeping well.   She is concerned about her weight. She has not been exercising. She notes a tear in the fascia in the right foot in 10/2022. She has recently been cleared to use the elliptical. She reports poor eating habits.     She is followed by endocrinology for thyroid nodules. She was advised to follow up in 1 year to repeat a thyroid ultrasound.     The patient has not completed a mammogram. She is due for a colonoscopy. She denies any known family history of colon cancer. She has issues with hemorrhoids. She notes the hemorrhoids cause obstruction when passing a bowel movement.       The following portions of the patient's history were reviewed and updated as appropriate: allergies, current medications, past family history, past medical history, past social history, past surgical history and problem list.    Objective      Physical Exam  Vitals reviewed.   Constitutional:       Appearance: She is well-developed.   Cardiovascular:      Rate and Rhythm: Normal rate and regular rhythm.      Heart sounds: Normal heart sounds.   Pulmonary:      Effort: Pulmonary effort is normal.      Breath sounds: Normal breath sounds.   Skin:     Comments: Left hand 3rd digit wart present at DIP   Neurological:      Mental Status: She is alert.   Psychiatric:         Behavior: Behavior normal.        Result Review :         Cryotherapy, Skin Lesion    Date/Time: 4/5/2023 11:08 AM  Performed by: Mariann Vega    Authorized by: Mariann Vega DO   Consent: Verbal consent obtained.  Risks and benefits: risks, benefits and alternatives were discussed  Consent given by: patient  Patient understanding: patient states understanding of the procedure being performed  Patient consent: the patient's understanding of the procedure matches consent given  Procedure consent: procedure consent matches procedure scheduled  Relevant documents: relevant documents present and verified  Patient identity confirmed: verbally with patient  Local anesthesia used: no    Anesthesia:  Local anesthesia used: no    Sedation:  Patient sedated: no    Patient tolerance: patient tolerated the procedure well with no immediate complications            Assessment and Plan    Diagnoses and all orders for this visit:    1. Attention deficit disorder (ADD) without hyperactivity (Primary)  -     Compliance Drug Analysis, Ur - Urine, Clean Catch  -     Comprehensive Metabolic Panel    2. Multiple thyroid nodules  -     US Thyroid  -     Comprehensive Metabolic Panel  -     TSH+Free T4  -     Thyroid Peroxidase Antibody    3. Obesity (BMI 30.0-34.9)  -     Comprehensive Metabolic Panel  -     phentermine (ADIPEX-P) 37.5 MG tablet; Start 1/2 tab po qAM x 4 days, then increase to 1 tab po qAM  Dispense: 30 tablet; Refill: 0    4. BMI 34.0-34.9,adult  -     Comprehensive Metabolic Panel  -     phentermine (ADIPEX-P) 37.5 MG tablet; Start 1/2 tab po qAM x 4 days, then increase to 1 tab po qAM  Dispense: 30 tablet; Refill: 0    5. Encounter for hepatitis C screening test for low risk patient  -     Hepatitis C Antibody    6. Screening for colon cancer  -     Ambulatory Referral For Screening Colonoscopy    7. Wart of hand  -     Cryotherapy, Skin Lesion    1. Wart on left hand  - Cryotherapy lesion today.     2. ADHD  - She will hold Adderall at this time, as she would like to take Phentermine for weight loss.     3. Weight gain  - Prescribe phentermine.     4.  Thyroid nodules  - Continue following with endocrinology.     5. Health maintenance  - Routine lab work ordered.   - Order for mammogram placed.       Follow Up   Return in about 4 weeks (around 5/3/2023) for Recheck.  Patient was given instructions and counseling regarding her condition or for health maintenance advice. Please see specific information pulled into the AVS if appropriate.     Transcribed from ambient dictation for Mariann Vega DO by Maricarmen Scott.  04/05/23   11:23 EDT    Patient or patient representative verbalized consent to the visit recording.  I have personally performed the services described in this document as transcribed by the above individual, and it is both accurate and complete.  Mariann Vega DO  4/5/2023  21:31 EDT

## 2023-04-10 ENCOUNTER — OFFICE VISIT (OUTPATIENT)
Dept: OBSTETRICS AND GYNECOLOGY | Facility: CLINIC | Age: 43
End: 2023-04-10
Payer: MEDICAID

## 2023-04-10 VITALS
HEIGHT: 70 IN | SYSTOLIC BLOOD PRESSURE: 120 MMHG | DIASTOLIC BLOOD PRESSURE: 78 MMHG | WEIGHT: 240 LBS | BODY MASS INDEX: 34.36 KG/M2

## 2023-04-10 DIAGNOSIS — Z09 POSTOPERATIVE FOLLOW-UP: Primary | ICD-10-CM

## 2023-04-10 NOTE — PROGRESS NOTES
"     OBGYN Postoperative Exam Note          Subjective   Chief Complaint   Patient presents with   • Post-op     Elisabeth Bustos is a 43 y.o. year old  presenting to be seen for her 4 week post-operative visit for Hysteroscopy, Laparoscopic  Bilateral Salpingectomy, D&C, Endometrial Ablation, and IUD on 2023 at [facility]. removal Currently she reports Post-op reports:\"no problems with eating, bowel movements, voiding, or wound drainage\",\"patient reports she has no pain. She has no bleeding just a pinkish watery discharge that sometimes is yellow patient denies vaginal itching, odor and burning.    Pathology Results are Negative for Dysplasia and Hyperplasia, Results are scanned to Chart.    OTHER THINGS SHE WANTS TO DISCUSS TODAY:  Nothing else    The following portions of the patient's history were reviewed and updated as appropriate:current medications and allergies       Objective   /78   Ht 177.8 cm (70\")   Wt 109 kg (240 lb)   LMP 2023 (Exact Date)   BMI 34.44 kg/m²     General:  well developed; well nourished  no acute distress   Abdomen: soft, non-tender; no masses  incision is clean, dry, intact, without drainage and well healed   Pelvis: Clinical staff was present for exam  External genitalia:  normal appearance of the external genitalia including Bartholin's and Morales-Sanchez's glands.  :  urethral meatus normal;  Vaginal:  normal pink mucosa without prolapse or lesions.  Cervix:  normal appearance.  Uterus:  normal size, shape and consistency.  Adnexa:  normal bimanual exam of the adnexa.          Assessment   1. S/P IUD removal, hysteroscopy, D&C, ablation, bilateral salpingectomy  Problems Addressed this Visit    None  Visit Diagnoses     Postoperative follow-up    -  Primary      Diagnoses       Codes Comments    Postoperative follow-up    -  Primary ICD-10-CM: Z09  ICD-9-CM: V67.00              Plan   1. May return to full activity with no restrictions  2. The importance of " keeping all planned follow-up and taking all medications as prescribed was emphasized.  3. Follow up for annual exam or prn    No orders of the defined types were placed in this encounter.             Lissette Quintanilla MD  04/10/2023

## 2023-04-11 LAB
ALBUMIN SERPL-MCNC: 4.2 G/DL (ref 3.8–4.8)
ALBUMIN/GLOB SERPL: 1.9 {RATIO} (ref 1.2–2.2)
ALP SERPL-CCNC: 69 IU/L (ref 44–121)
ALT SERPL-CCNC: 14 IU/L (ref 0–32)
AST SERPL-CCNC: 12 IU/L (ref 0–40)
BILIRUB SERPL-MCNC: <0.2 MG/DL (ref 0–1.2)
BUN SERPL-MCNC: 11 MG/DL (ref 6–24)
BUN/CREAT SERPL: 14 (ref 9–23)
CALCIUM SERPL-MCNC: 9.1 MG/DL (ref 8.7–10.2)
CHLORIDE SERPL-SCNC: 107 MMOL/L (ref 96–106)
CO2 SERPL-SCNC: 21 MMOL/L (ref 20–29)
CREAT SERPL-MCNC: 0.77 MG/DL (ref 0.57–1)
DRUGS UR: NORMAL
EGFRCR SERPLBLD CKD-EPI 2021: 97 ML/MIN/1.73
GLOBULIN SER CALC-MCNC: 2.2 G/DL (ref 1.5–4.5)
GLUCOSE SERPL-MCNC: 84 MG/DL (ref 70–99)
HCV IGG SERPL QL IA: NON REACTIVE
POTASSIUM SERPL-SCNC: 4.2 MMOL/L (ref 3.5–5.2)
PROT SERPL-MCNC: 6.4 G/DL (ref 6–8.5)
SODIUM SERPL-SCNC: 142 MMOL/L (ref 134–144)
T4 FREE SERPL-MCNC: 1.28 NG/DL (ref 0.82–1.77)
THYROPEROXIDASE AB SERPL-ACNC: 11 IU/ML (ref 0–34)
TSH SERPL DL<=0.005 MIU/L-ACNC: 1.31 UIU/ML (ref 0.45–4.5)

## 2023-05-08 ENCOUNTER — TELEPHONE (OUTPATIENT)
Dept: FAMILY MEDICINE CLINIC | Facility: CLINIC | Age: 43
End: 2023-05-08

## 2023-05-08 DIAGNOSIS — E66.9 OBESITY (BMI 30.0-34.9): ICD-10-CM

## 2023-05-08 RX ORDER — PHENTERMINE HYDROCHLORIDE 37.5 MG/1
TABLET ORAL
Qty: 30 TABLET | Refills: 0 | Status: CANCELLED | OUTPATIENT
Start: 2023-05-08

## 2023-05-08 NOTE — TELEPHONE ENCOUNTER
"Last office note \"Return in about 4 weeks (around 5/3/2023) for Recheck.\"   Needs appt to continue treatment with phentermine.   "

## 2023-05-08 NOTE — TELEPHONE ENCOUNTER
Caller: Elisabeth Bustos    Relationship to patient: Self    Best call back number: [phone]    Patient is needing: PATIENT HAS AN APPOINTMENT ON 6.8.23 AND SHE IS UNSURE OF WHAT THE FOLLOW UP IS FOR. SHE STATES IT COULD BE FOR THE WART REMOVAL OR THE NEW MEDICATION SHE WAS PUT ON.     PATIENT STATES SHE WILL RUN OUT OF HER NEW MEDICATION ON 5.11.23 AND IS IN NEED OF A REFILL TO GET HER TO THE APPOINTMENT IF THAT IS WHAT IT IS FOR.     phentermine (ADIPEX-P) 37.5 MG tablet    [id number] - [place], [place] - [address] [facility] [address] - [phone]  - [phone]  [phone]    PLEASE CALL PATIENT BACK, IF NO ANSWER LEAVE A DETAILED MESSAGE.

## 2023-05-11 ENCOUNTER — OFFICE VISIT (OUTPATIENT)
Dept: FAMILY MEDICINE CLINIC | Facility: CLINIC | Age: 43
End: 2023-05-11
Payer: MEDICAID

## 2023-05-11 VITALS
HEART RATE: 66 BPM | RESPIRATION RATE: 14 BRPM | SYSTOLIC BLOOD PRESSURE: 124 MMHG | OXYGEN SATURATION: 99 % | BODY MASS INDEX: 33.36 KG/M2 | WEIGHT: 233 LBS | TEMPERATURE: 98 F | DIASTOLIC BLOOD PRESSURE: 74 MMHG | HEIGHT: 70 IN

## 2023-05-11 DIAGNOSIS — E66.9 OBESITY (BMI 30.0-34.9): ICD-10-CM

## 2023-05-11 RX ORDER — PHENTERMINE HYDROCHLORIDE 37.5 MG/1
37.5 TABLET ORAL
Qty: 30 TABLET | Refills: 1 | Status: SHIPPED | OUTPATIENT
Start: 2023-05-11

## 2023-05-11 NOTE — PROGRESS NOTES
Chief Complaint  4 wk f/u (Weight loss)    Subjective          Elisabeth Bustos presents to [facility]  History of Present Illness  She is taking phentermine to help with weight loss   Down 7 lbs   She is limiting calorie intake, going to gym routinely.   Tolerating phentermine without side effect.     The following portions of the patient's history were reviewed and updated as appropriate: allergies, current medications, past family history, past medical history, past social history, past surgical history and problem list.    Objective      Physical Exam  Vitals reviewed.   Constitutional:       Appearance: She is well-developed.   Cardiovascular:      Rate and Rhythm: Normal rate.      Heart sounds: Normal heart sounds.   Pulmonary:      Effort: Pulmonary effort is normal.      Breath sounds: Normal breath sounds.   Neurological:      Mental Status: She is alert.        Result Review :                Assessment and Plan    Diagnoses and all orders for this visit:    1. Obesity (BMI 30.0-34.9)  -     phentermine (ADIPEX-P) 37.5 MG tablet; Take 1 tablet by mouth Every Morning Before Breakfast.  Dispense: 30 tablet; Refill: 1    2. BMI 33.0-33.9,adult  -     phentermine (ADIPEX-P) 37.5 MG tablet; Take 1 tablet by mouth Every Morning Before Breakfast.  Dispense: 30 tablet; Refill: 1    Continue Phentermine. Stop the medication and seek medical attention if chest pain, palpitations, agitation, SOA, headaches, or any other adverse reactions occur. Encouraged to continue dieting and exercising as tolerated.         Follow Up   Return in about 8 weeks (around 7/6/2023) for Recheck.  Patient was given instructions and counseling regarding her condition or for health maintenance advice. Please see specific information pulled into the AVS if appropriate.

## 2023-06-06 ENCOUNTER — OFFICE VISIT (OUTPATIENT)
Dept: FAMILY MEDICINE CLINIC | Facility: CLINIC | Age: 43
End: 2023-06-06
Payer: COMMERCIAL

## 2023-06-06 VITALS
SYSTOLIC BLOOD PRESSURE: 122 MMHG | RESPIRATION RATE: 16 BRPM | BODY MASS INDEX: 33.39 KG/M2 | DIASTOLIC BLOOD PRESSURE: 70 MMHG | OXYGEN SATURATION: 98 % | HEIGHT: 70 IN | WEIGHT: 233.2 LBS | HEART RATE: 76 BPM | TEMPERATURE: 97.3 F

## 2023-06-06 DIAGNOSIS — R11.0 NAUSEA WITHOUT VOMITING: ICD-10-CM

## 2023-06-06 DIAGNOSIS — R19.7 DIARRHEA, UNSPECIFIED TYPE: Primary | ICD-10-CM

## 2023-06-06 PROCEDURE — 99213 OFFICE O/P EST LOW 20 MIN: CPT | Performed by: NURSE PRACTITIONER

## 2023-06-06 NOTE — ASSESSMENT & PLAN NOTE
Assessment & Plan   Nausea without vomiting    Educational material distributed.  Labs per orders.

## 2023-06-06 NOTE — ASSESSMENT & PLAN NOTE
Assessment & Plan   Diarrhea,  Suspect H. pylori    Appropriate educational material discussed and distributed.  Follow up as needed.  Lab studies per orders.  OTC antidiarrheals may be used judiciously.

## 2023-06-06 NOTE — PROGRESS NOTES
" Chief Complaint  Diarrhea (Since yesterday morning.) and Nausea (Nausea with \"egg\" tasting burps, achy.)    Subjective      History of Present Illness  Elisabeth is a 43 y.o. female who presents to the clinic today for evaluation of diarrhea. Onset of diarrhea was 1 days ago. Diarrhea is occurring approximately 8 times per day. Patient describes diarrhea as semisolid and watery. Diarrhea has been associated with abdominal pain described as aching and dull and suspicious food/drink [place]? . Patient denies blood in stool, fever, illness in household contacts, recent antibiotic use, recent camping, significant abdominal pain, unintentional weight loss. Previous visits for diarrhea: none. Evaluation to date: none.  Treatment to date: Fluid hydration, light foods.    The following portions of the patient's history were reviewed and updated as appropriate: allergies, current medications, past family history, past medical history, past social history, past surgical history, and problem list.    Review of Systems  Pertinent items are noted in HPI.        Objective   Vital Signs:  /70   Pulse 76   Temp 97.3 °F (36.3 °C)   Resp 16   Ht 177.8 cm (70\")   Wt 106 kg (233 lb 3.2 oz)   SpO2 98%   BMI 33.46 kg/m²   Estimated body mass index is 33.46 kg/m² as calculated from the following:    Height as of this encounter: 177.8 cm (70\").    Weight as of this encounter: 106 kg (233 lb 3.2 oz).          Physical Exam  Constitutional:       General: She is not in acute distress.  HENT:      Head: Normocephalic and atraumatic.   Eyes:      Conjunctiva/sclera: Conjunctivae normal.   Cardiovascular:      Rate and Rhythm: Normal rate and regular rhythm.      Pulses: Normal pulses.      Heart sounds: Normal heart sounds.   Pulmonary:      Effort: Pulmonary effort is normal.      Breath sounds: Normal breath sounds.   Abdominal:      General: Abdomen is protuberant. Bowel sounds are normal.      Palpations: Abdomen is " soft.      Tenderness:  in the left upper quadrant and left lower quadrant   Musculoskeletal:         General: Normal range of motion.      Cervical back: Normal range of motion and neck supple.   Skin:     General: Skin is warm and dry.   Neurological:      General: No focal deficit present.      Mental Status: She is alert.   Psychiatric:         Mood and Affect: Mood normal.         Behavior: Behavior normal.         Thought Content: Thought content normal.         Judgment: Judgment normal.        Result Review                    Assessment and Plan  Diagnoses and all orders for this visit:    1. Diarrhea, unspecified type (Primary)  Assessment & Plan:  Assessment & Plan  Diarrhea,  Suspect H. pylori    Appropriate educational material discussed and distributed.  Follow up as needed.  Lab studies per orders.  OTC antidiarrheals may be used judiciously.             Orders:  -     H. Pylori Breath Test - Breath, Lung    2. Nausea without vomiting  Assessment & Plan:  Assessment & Plan  Nausea without vomiting    Educational material distributed.  Labs per orders.             Orders:  -     H. Pylori Breath Test - Breath, Lung               Follow Up  Return if symptoms worsen or fail to improve.  Patient was given instructions and counseling regarding her condition or for health maintenance advice. Please see specific information pulled into the AVS if appropriate.    Part of this note may be an electronic transcription/translation of spoken language to printed text using the Dragon Dictation System.

## 2023-06-07 LAB
H. PYLORI BREATH COLLECTION: NORMAL
UREA BREATH TEST QL: NEGATIVE

## 2023-08-03 ENCOUNTER — OFFICE VISIT (OUTPATIENT)
Dept: FAMILY MEDICINE CLINIC | Facility: CLINIC | Age: 43
End: 2023-08-03
Payer: COMMERCIAL

## 2023-08-03 VITALS
RESPIRATION RATE: 18 BRPM | OXYGEN SATURATION: 98 % | HEART RATE: 52 BPM | WEIGHT: 239.8 LBS | TEMPERATURE: 98.2 F | HEIGHT: 70 IN | DIASTOLIC BLOOD PRESSURE: 92 MMHG | SYSTOLIC BLOOD PRESSURE: 148 MMHG | BODY MASS INDEX: 34.33 KG/M2

## 2023-08-03 DIAGNOSIS — E66.9 OBESITY (BMI 30.0-34.9): ICD-10-CM

## 2023-08-03 DIAGNOSIS — F98.8 ATTENTION DEFICIT DISORDER (ADD) WITHOUT HYPERACTIVITY: Primary | ICD-10-CM

## 2023-08-03 PROCEDURE — 99213 OFFICE O/P EST LOW 20 MIN: CPT | Performed by: FAMILY MEDICINE

## 2023-08-03 RX ORDER — DEXTROAMPHETAMINE SACCHARATE, AMPHETAMINE ASPARTATE MONOHYDRATE, DEXTROAMPHETAMINE SULFATE AND AMPHETAMINE SULFATE 5; 5; 5; 5 MG/1; MG/1; MG/1; MG/1
20 CAPSULE, EXTENDED RELEASE ORAL EVERY MORNING
Qty: 30 CAPSULE | Refills: 0 | Status: SHIPPED | OUTPATIENT
Start: 2023-08-03

## 2023-10-23 DIAGNOSIS — F98.8 ATTENTION DEFICIT DISORDER (ADD) WITHOUT HYPERACTIVITY: ICD-10-CM

## 2023-10-23 RX ORDER — DEXTROAMPHETAMINE SACCHARATE, AMPHETAMINE ASPARTATE MONOHYDRATE, DEXTROAMPHETAMINE SULFATE AND AMPHETAMINE SULFATE 5; 5; 5; 5 MG/1; MG/1; MG/1; MG/1
20 CAPSULE, EXTENDED RELEASE ORAL EVERY MORNING
Qty: 30 CAPSULE | Refills: 0 | Status: SHIPPED | OUTPATIENT
Start: 2023-10-23

## 2023-11-27 DIAGNOSIS — F98.8 ATTENTION DEFICIT DISORDER (ADD) WITHOUT HYPERACTIVITY: ICD-10-CM

## 2023-11-27 RX ORDER — DEXTROAMPHETAMINE SACCHARATE, AMPHETAMINE ASPARTATE MONOHYDRATE, DEXTROAMPHETAMINE SULFATE AND AMPHETAMINE SULFATE 5; 5; 5; 5 MG/1; MG/1; MG/1; MG/1
20 CAPSULE, EXTENDED RELEASE ORAL EVERY MORNING
Qty: 30 CAPSULE | Refills: 0 | Status: SHIPPED | OUTPATIENT
Start: 2023-11-27

## 2024-01-25 ENCOUNTER — OFFICE VISIT (OUTPATIENT)
Dept: FAMILY MEDICINE CLINIC | Facility: CLINIC | Age: 44
End: 2024-01-25
Payer: MEDICAID

## 2024-01-25 ENCOUNTER — TELEPHONE (OUTPATIENT)
Dept: FAMILY MEDICINE CLINIC | Facility: CLINIC | Age: 44
End: 2024-01-25

## 2024-01-25 VITALS
BODY MASS INDEX: 35.19 KG/M2 | OXYGEN SATURATION: 100 % | SYSTOLIC BLOOD PRESSURE: 126 MMHG | TEMPERATURE: 97.5 F | DIASTOLIC BLOOD PRESSURE: 84 MMHG | HEART RATE: 62 BPM | RESPIRATION RATE: 18 BRPM | WEIGHT: 245.8 LBS | HEIGHT: 70 IN

## 2024-01-25 DIAGNOSIS — F98.8 ATTENTION DEFICIT DISORDER (ADD) WITHOUT HYPERACTIVITY: Primary | ICD-10-CM

## 2024-01-25 DIAGNOSIS — F33.1 MODERATE EPISODE OF RECURRENT MAJOR DEPRESSIVE DISORDER: ICD-10-CM

## 2024-01-25 RX ORDER — BUPROPION HYDROCHLORIDE 150 MG/1
150 TABLET ORAL EVERY MORNING
Qty: 90 TABLET | Refills: 1 | Status: SHIPPED | OUTPATIENT
Start: 2024-01-25

## 2024-01-25 RX ORDER — ACETAMINOPHEN 500 MG
500 TABLET ORAL EVERY 6 HOURS PRN
COMMUNITY

## 2024-01-25 NOTE — PROGRESS NOTES
Chief Complaint  6 mo f/u (Possibly wanting to try a new med for ADHD.)    Subjective          Elisabeth Bustos presents to [facility]  History of Present Illness  The patient is a 43-year-old female who presents for evaluation of multiple medical concerns.    She is on Adderall for her ADHD, which is working well for her.   She is wondering if she can go back on Wellbutrin, as her mood has been down. She is not anxious, but she is feeling down.    She has started taking [employer] organic supplement vitamins. She has a mutation of the MTFRG.        The following portions of the patient's history were reviewed and updated as appropriate: allergies, current medications, past family history, past medical history, past social history, past surgical history, and problem list.    Objective      Physical Exam  Vitals reviewed.   Cardiovascular:      Rate and Rhythm: Normal rate.      Heart sounds: Normal heart sounds.   Pulmonary:      Effort: Pulmonary effort is normal.      Breath sounds: Normal breath sounds.   Neurological:      Mental Status: She is alert.   Psychiatric:         Mood and Affect: Mood is depressed. Affect is tearful.        Result Review :                Assessment and Plan    Diagnoses and all orders for this visit:    1. Attention deficit disorder (ADD) without hyperactivity (Primary)  -     Compliance Drug Analysis, Ur - Urine, Clean Catch    2. Moderate episode of recurrent major depressive disorder  -     buPROPion XL (Wellbutrin XL) 150 MG 24 hr tablet; Take 1 tablet by mouth Every Morning.  Dispense: 90 tablet; Refill: 1      I will restart her on Wellbutrin 150 mg once a day to improve depression. She will let us know in a month if the dose needs to be increased.  She will continue Adderall for treatment of ADHD.   Drug screen collected today, Ceasar reviewed.    She would like to complete colonoscopy and mammogram, but does not have insurance at this  time.  Will provide [facility] with [facility] to [facility] regarding free mamm[facility].    Follow Up   Return in about 6 months (around 7/25/2024) for Recheck.  Patient was given instructions and counseling regarding her condition or for health maintenance advice. Please see specific information pulled into the AVS if appropriate.     Transcribed from ambient dictation for Mariann Vega DO by Sonia Blackwood.  01/25/24   13:40 EST    Patient or patient representative verbalized consent to the visit recording.  I have personally performed the services described in this document as transcribed by the above individual, and it is both accurate and complete.

## 2024-01-25 NOTE — TELEPHONE ENCOUNTER
She requested information regarding free mammograms. She can go to the [facility] website and it will be under classes/events. There should be info there on how to schedule. I believe the event is in October each year.

## 2024-01-31 LAB — DRUGS UR: NORMAL

## 2024-02-26 ENCOUNTER — TELEPHONE (OUTPATIENT)
Dept: FAMILY MEDICINE CLINIC | Facility: CLINIC | Age: 44
End: 2024-02-26
Payer: MEDICAID

## 2024-02-26 DIAGNOSIS — F33.1 MODERATE EPISODE OF RECURRENT MAJOR DEPRESSIVE DISORDER: ICD-10-CM

## 2024-02-26 RX ORDER — BUPROPION HYDROCHLORIDE 300 MG/1
300 TABLET ORAL EVERY MORNING
Qty: 90 TABLET | Refills: 1 | Status: SHIPPED | OUTPATIENT
Start: 2024-02-26

## 2024-02-26 NOTE — TELEPHONE ENCOUNTER
----- Message from Kaitlin Vidales MA sent at 2/26/2024  3:14 PM EST -----  Regarding: FW: Medicine mg increase  Contact: [phone]  Dose increase?  ----- Message -----  From: Elisabeth Busots  Sent: 2/26/2024   7:12 AM EST  To: [facility] Clinical Pool  Subject: Medicine mg increase                             Can we please increase the dosage of my Wellbutrin as discussed. It is time for my 1 month refill. Thank you

## 2024-06-10 DIAGNOSIS — F33.1 MODERATE EPISODE OF RECURRENT MAJOR DEPRESSIVE DISORDER: ICD-10-CM

## 2024-06-10 RX ORDER — BUPROPION HYDROCHLORIDE 300 MG/1
300 TABLET ORAL EVERY MORNING
Qty: 90 TABLET | Refills: 1 | OUTPATIENT
Start: 2024-06-10

## 2024-07-25 ENCOUNTER — OFFICE VISIT (OUTPATIENT)
Dept: FAMILY MEDICINE CLINIC | Facility: CLINIC | Age: 44
End: 2024-07-25
Payer: COMMERCIAL

## 2024-07-25 VITALS
HEART RATE: 68 BPM | RESPIRATION RATE: 16 BRPM | BODY MASS INDEX: 34.3 KG/M2 | DIASTOLIC BLOOD PRESSURE: 80 MMHG | TEMPERATURE: 97.3 F | OXYGEN SATURATION: 100 % | HEIGHT: 70 IN | WEIGHT: 239.6 LBS | SYSTOLIC BLOOD PRESSURE: 140 MMHG

## 2024-07-25 DIAGNOSIS — R53.83 OTHER FATIGUE: ICD-10-CM

## 2024-07-25 DIAGNOSIS — R40.0 DAYTIME SLEEPINESS: ICD-10-CM

## 2024-07-25 DIAGNOSIS — Z71.3 DIETARY COUNSELING AND SURVEILLANCE: ICD-10-CM

## 2024-07-25 DIAGNOSIS — Z12.31 ENCOUNTER FOR SCREENING MAMMOGRAM FOR MALIGNANT NEOPLASM OF BREAST: ICD-10-CM

## 2024-07-25 DIAGNOSIS — Z12.11 SCREENING FOR COLON CANCER: ICD-10-CM

## 2024-07-25 DIAGNOSIS — E66.9 OBESITY (BMI 30.0-34.9): ICD-10-CM

## 2024-07-25 DIAGNOSIS — E55.9 VITAMIN D DEFICIENCY: Primary | ICD-10-CM

## 2024-07-25 DIAGNOSIS — G43.809 OTHER MIGRAINE WITHOUT STATUS MIGRAINOSUS, NOT INTRACTABLE: ICD-10-CM

## 2024-07-25 PROCEDURE — 99214 OFFICE O/P EST MOD 30 MIN: CPT | Performed by: FAMILY MEDICINE

## 2024-07-25 RX ORDER — SEMAGLUTIDE 1 MG/.5ML
1 INJECTION, SOLUTION SUBCUTANEOUS WEEKLY
Qty: 2 ML | Refills: 0 | Status: SHIPPED | OUTPATIENT
Start: 2024-07-25

## 2024-07-25 NOTE — PROGRESS NOTES
Chief Complaint  6 mo f/u (ADD f/u, weight - taking semaglutide for last 6 wks from weight spa, wondering about starting Wegovy and see if insurance will cover. She would be starting the 1mg dose equivalent.)    Deyanira          Elisabeth Bustos presents to [facility]    History of Present Illness  The patient presents for a follow-up visit.    She is currently on semaglutide, a medication she has been tolerating well. Initially, she was on a 0.5 mg dose for 2 weeks, which was later increased to 1 mg, which she continued for a month. This treatment has been effective, with a weight loss of 14 pounds, which she regained 3 pounds in the past week, primarily due to water weight.   She has been on this treatment for a month and a half, with two doses remaining. She reports no issues with semaglutide and has been maintaining a diet of between 1400 and 1600 calories. She has also been attending the gym, running, and weight training.  She has struggled with losing weight in the past and fatigue.  She has discontinued medication for Attention Deficit Disorder (ADD).  She is due for a mammogram and is interested in colon cancer screening.    She continues to have migraine headaches and would like to consult with a neurologist.    The following portions of the patient's history were reviewed and updated as appropriate: allergies, current medications, past family history, past medical history, past social history, past surgical history, and problem list.    Objective      Physical Exam  Vitals reviewed.   Cardiovascular:      Rate and Rhythm: Normal rate.      Heart sounds: Normal heart sounds.   Pulmonary:      Effort: Pulmonary effort is normal.      Breath sounds: Normal breath sounds.   Neurological:      Mental Status: She is alert.        Physical Exam      Result Review :       Results               Assessment and Plan    Diagnoses and all orders for this visit:    1. Vitamin D  deficiency (Primary)  -     CBC (No Diff)  -     Comprehensive Metabolic Panel  -     Hemoglobin A1c  -     Lipid Panel With / Chol / HDL Ratio  -     TSH+Free T4  -     Vitamin D,25-Hydroxy  -     T3, free  -     Thyroid Peroxidase Antibody    2. Daytime sleepiness  -     CBC (No Diff)  -     Comprehensive Metabolic Panel  -     Hemoglobin A1c  -     Lipid Panel With / Chol / HDL Ratio  -     TSH+Free T4  -     Vitamin D,25-Hydroxy  -     T3, free  -     Thyroid Peroxidase Antibody    3. Other migraine without status migrainosus, not intractable  -     Ambulatory Referral to Neurology    4. Other fatigue  -     CBC (No Diff)  -     Comprehensive Metabolic Panel  -     Hemoglobin A1c  -     Lipid Panel With / Chol / HDL Ratio  -     TSH+Free T4  -     Vitamin D,25-Hydroxy  -     T3, free  -     Thyroid Peroxidase Antibody    5. Obesity (BMI 30.0-34.9)  -     CBC (No Diff)  -     Comprehensive Metabolic Panel  -     Hemoglobin A1c  -     Lipid Panel With / Chol / HDL Ratio  -     TSH+Free T4  -     Vitamin D,25-Hydroxy  -     T3, free  -     Thyroid Peroxidase Antibody  -     Semaglutide-Weight Management (Wegovy) 1 MG/0.5ML solution auto-injector; Inject 0.5 mL under the skin into the appropriate area as directed 1 (One) Time Per Week.  Dispense: 2 mL; Refill: 0    6. Dietary counseling and surveillance  -     CBC (No Diff)  -     Comprehensive Metabolic Panel  -     Hemoglobin A1c  -     Lipid Panel With / Chol / HDL Ratio  -     TSH+Free T4  -     Vitamin D,25-Hydroxy  -     T3, free  -     Thyroid Peroxidase Antibody    7. Screening for colon cancer  -     Ambulatory Referral For Screening Colonoscopy    8. Encounter for screening mammogram for malignant neoplasm of breast  -     Mammo Screening Digital Tomosynthesis Bilateral With CAD; Future      Assessment & Plan  1. Routine follow-up.  Laboratory tests were ordered.   She contacted her provider at [facility] and confirmed current semaglutide dose is 1 mg  weekly.  Prescription sent to pharmacy.        Follow Up   Return in about 3 months (around 10/25/2024).    Patient or patient representative verbalized consent for the use of Ambient Listening during the visit with  Mariann Vega DO for chart documentation. 7/25/2024  18:58 EDT  Patient was given instructions and counseling regarding her condition or for health maintenance advice. Please see specific information pulled into the AVS if appropriate.

## 2024-07-25 NOTE — PROGRESS NOTES
Chief Complaint  6 mo f/u (ADD f/u, weight - taking semaglutide for last 6 wks from weight spa, )    Subjective     {Problem List  Visit Diagnosis   Encounters  Notes  Medications  Labs  Result Review Imaging  Media :23}     Elisabeth Bustos presents to [facility]  History of Present Illness      {Common H&P Review Areas:21255}    Objective      Physical Exam   Result Review :{Labs  Result Review  Imaging  Med Tab  Media :23}   {The following data was reviewed by (Optional):25131}  {Ambulatory Labs (Optional):07143}           Assessment and Plan {CC Problem List  Visit Diagnosis  ROS  Review (Popup)  Health Maintenance  Quality  BestPractice  Medications  SmartSets  SnapShot Encounters  Media :23}   There are no diagnoses linked to this encounter.  {Time Spent (Optional):25932}  Follow Up {Instructions Charge Capture  Follow-up Communications :23}  No follow-ups on file.  Patient was given instructions and counseling regarding her condition or for health maintenance advice. Please see specific information pulled into the AVS if appropriate.

## 2024-08-01 ENCOUNTER — TELEPHONE (OUTPATIENT)
Dept: FAMILY MEDICINE CLINIC | Facility: CLINIC | Age: 44
End: 2024-08-01
Payer: COMMERCIAL

## 2024-08-01 ENCOUNTER — LAB (OUTPATIENT)
Dept: FAMILY MEDICINE CLINIC | Facility: CLINIC | Age: 44
End: 2024-08-01
Payer: COMMERCIAL

## 2024-08-01 DIAGNOSIS — Z01.89 PATIENT REQUEST FOR DIAGNOSTIC TESTING: Primary | ICD-10-CM

## 2024-08-01 NOTE — TELEPHONE ENCOUNTER
PATIENT IS ASKING TO HAVE A LAB PUT IN FOR HER TO FIND OUT WHAT BLOOD TYPE SHE IS. LAB ALREADY SARAH THE BLOOD JUST NEED THE ORDER TO PROCESS IT.        PLEASE ADVISE

## 2024-08-02 LAB
25(OH)D3+25(OH)D2 SERPL-MCNC: 34.5 NG/ML (ref 30–100)
ALBUMIN SERPL-MCNC: 4.3 G/DL (ref 3.5–5.2)
ALBUMIN/GLOB SERPL: 2 G/DL
ALP SERPL-CCNC: 68 U/L (ref 39–117)
ALT SERPL-CCNC: 25 U/L (ref 1–33)
AST SERPL-CCNC: 16 U/L (ref 1–32)
BILIRUB SERPL-MCNC: 0.7 MG/DL (ref 0–1.2)
BUN SERPL-MCNC: 12 MG/DL (ref 6–20)
BUN/CREAT SERPL: 13.2 (ref 7–25)
CALCIUM SERPL-MCNC: 9.2 MG/DL (ref 8.6–10.5)
CHLORIDE SERPL-SCNC: 105 MMOL/L (ref 98–107)
CHOLEST SERPL-MCNC: 171 MG/DL (ref 0–200)
CHOLEST/HDLC SERPL: 3.72 {RATIO}
CO2 SERPL-SCNC: 24.8 MMOL/L (ref 22–29)
CREAT SERPL-MCNC: 0.91 MG/DL (ref 0.57–1)
EGFRCR SERPLBLD CKD-EPI 2021: 79 ML/MIN/1.73
ERYTHROCYTE [DISTWIDTH] IN BLOOD BY AUTOMATED COUNT: 13 % (ref 12.3–15.4)
GLOBULIN SER CALC-MCNC: 2.2 GM/DL
GLUCOSE SERPL-MCNC: 83 MG/DL (ref 65–99)
HBA1C MFR BLD: 5.3 % (ref 4.8–5.6)
HCT VFR BLD AUTO: 44 % (ref 34–46.6)
HDLC SERPL-MCNC: 46 MG/DL (ref 40–60)
HGB BLD-MCNC: 14.4 G/DL (ref 12–15.9)
LDLC SERPL CALC-MCNC: 111 MG/DL (ref 0–100)
MCH RBC QN AUTO: 28.6 PG (ref 26.6–33)
MCHC RBC AUTO-ENTMCNC: 32.7 G/DL (ref 31.5–35.7)
MCV RBC AUTO: 87.3 FL (ref 79–97)
PLATELET # BLD AUTO: 213 10*3/MM3 (ref 140–450)
POTASSIUM SERPL-SCNC: 3.9 MMOL/L (ref 3.5–5.2)
PROT SERPL-MCNC: 6.5 G/DL (ref 6–8.5)
RBC # BLD AUTO: 5.04 10*6/MM3 (ref 3.77–5.28)
SODIUM SERPL-SCNC: 141 MMOL/L (ref 136–145)
T3FREE SERPL-MCNC: 3.5 PG/ML (ref 2–4.4)
T4 FREE SERPL-MCNC: 1.38 NG/DL (ref 0.92–1.68)
THYROPEROXIDASE AB SERPL-ACNC: 9 IU/ML (ref 0–34)
TRIGL SERPL-MCNC: 73 MG/DL (ref 0–150)
TSH SERPL DL<=0.005 MIU/L-ACNC: 1.73 UIU/ML (ref 0.27–4.2)
VLDLC SERPL CALC-MCNC: 14 MG/DL (ref 5–40)
WBC # BLD AUTO: 4.73 10*3/MM3 (ref 3.4–10.8)

## 2024-08-03 LAB
ABO GROUP BLD: NORMAL
RH BLD: POSITIVE

## 2024-08-28 RX ORDER — SEMAGLUTIDE 1.7 MG/.75ML
1.7 INJECTION, SOLUTION SUBCUTANEOUS WEEKLY
Qty: 3 ML | Refills: 0 | Status: SHIPPED | OUTPATIENT
Start: 2024-08-28

## 2024-09-16 LAB
NCCN CRITERIA FLAG: NORMAL
TYRER CUZICK SCORE: 8

## 2024-09-17 ENCOUNTER — PRIOR AUTHORIZATION (OUTPATIENT)
Dept: FAMILY MEDICINE CLINIC | Facility: CLINIC | Age: 44
End: 2024-09-17
Payer: COMMERCIAL

## 2024-11-08 ENCOUNTER — APPOINTMENT (OUTPATIENT)
Dept: GENERAL RADIOLOGY | Facility: HOSPITAL | Age: 44
End: 2024-11-08
Payer: COMMERCIAL

## 2024-11-08 ENCOUNTER — HOSPITAL ENCOUNTER (EMERGENCY)
Facility: HOSPITAL | Age: 44
Discharge: HOME | End: 2024-11-08
Attending: EMERGENCY MEDICINE
Payer: COMMERCIAL

## 2024-11-08 VITALS
SYSTOLIC BLOOD PRESSURE: 158 MMHG | OXYGEN SATURATION: 97 % | RESPIRATION RATE: 22 BRPM | HEIGHT: 70 IN | WEIGHT: 235 LBS | BODY MASS INDEX: 33.64 KG/M2 | HEART RATE: 61 BPM | TEMPERATURE: 98.1 F | DIASTOLIC BLOOD PRESSURE: 92 MMHG

## 2024-11-08 DIAGNOSIS — Z86.718 HISTORY OF DVT (DEEP VEIN THROMBOSIS): ICD-10-CM

## 2024-11-08 DIAGNOSIS — Z86.711 HISTORY OF PULMONARY EMBOLUS (PE): ICD-10-CM

## 2024-11-08 DIAGNOSIS — R07.89 ATYPICAL CHEST PAIN: Primary | ICD-10-CM

## 2024-11-08 DIAGNOSIS — Z86.59 HISTORY OF ANXIETY: ICD-10-CM

## 2024-11-08 DIAGNOSIS — I10 ELEVATED BLOOD PRESSURE READING WITH DIAGNOSIS OF HYPERTENSION: ICD-10-CM

## 2024-11-08 LAB
ALBUMIN SERPL-MCNC: 4.3 G/DL (ref 3.5–5.2)
ALBUMIN/GLOB SERPL: 1.5 G/DL
ALP SERPL-CCNC: 66 U/L (ref 39–117)
ALT SERPL W P-5'-P-CCNC: 14 U/L (ref 1–33)
ANION GAP SERPL CALCULATED.3IONS-SCNC: 7 MMOL/L (ref 5–15)
AST SERPL-CCNC: 16 U/L (ref 1–32)
BASOPHILS # BLD AUTO: 0.03 10*3/MM3 (ref 0–0.2)
BASOPHILS NFR BLD AUTO: 0.3 % (ref 0–1.5)
BILIRUB SERPL-MCNC: 0.3 MG/DL (ref 0–1.2)
BUN SERPL-MCNC: 12 MG/DL (ref 6–20)
BUN/CREAT SERPL: 14.5 (ref 7–25)
CALCIUM SPEC-SCNC: 9.7 MG/DL (ref 8.6–10.5)
CHLORIDE SERPL-SCNC: 103 MMOL/L (ref 98–107)
CO2 SERPL-SCNC: 28 MMOL/L (ref 22–29)
CREAT SERPL-MCNC: 0.83 MG/DL (ref 0.57–1)
D DIMER PPP FEU-MCNC: <0.27 MCGFEU/ML (ref 0–0.5)
DEPRECATED RDW RBC AUTO: 39.7 FL (ref 37–54)
EGFRCR SERPLBLD CKD-EPI 2021: 88.2 ML/MIN/1.73
EOSINOPHIL # BLD AUTO: 0.1 10*3/MM3 (ref 0–0.4)
EOSINOPHIL NFR BLD AUTO: 1.1 % (ref 0.3–6.2)
ERYTHROCYTE [DISTWIDTH] IN BLOOD BY AUTOMATED COUNT: 12.6 % (ref 12.3–15.4)
GEN 5 2HR TROPONIN T REFLEX: <6 NG/L
GLOBULIN UR ELPH-MCNC: 2.8 GM/DL
GLUCOSE SERPL-MCNC: 99 MG/DL (ref 65–99)
HCT VFR BLD AUTO: 43.2 % (ref 34–46.6)
HGB BLD-MCNC: 14.5 G/DL (ref 12–15.9)
IMM GRANULOCYTES # BLD AUTO: 0.02 10*3/MM3 (ref 0–0.05)
IMM GRANULOCYTES NFR BLD AUTO: 0.2 % (ref 0–0.5)
LIPASE SERPL-CCNC: 32 U/L (ref 13–60)
LYMPHOCYTES # BLD AUTO: 2.47 10*3/MM3 (ref 0.7–3.1)
LYMPHOCYTES NFR BLD AUTO: 26.7 % (ref 19.6–45.3)
MCH RBC QN AUTO: 28.9 PG (ref 26.6–33)
MCHC RBC AUTO-ENTMCNC: 33.6 G/DL (ref 31.5–35.7)
MCV RBC AUTO: 86.2 FL (ref 79–97)
MONOCYTES # BLD AUTO: 0.61 10*3/MM3 (ref 0.1–0.9)
MONOCYTES NFR BLD AUTO: 6.6 % (ref 5–12)
NEUTROPHILS NFR BLD AUTO: 6.01 10*3/MM3 (ref 1.7–7)
NEUTROPHILS NFR BLD AUTO: 65.1 % (ref 42.7–76)
NRBC BLD AUTO-RTO: 0 /100 WBC (ref 0–0.2)
NT-PROBNP SERPL-MCNC: 122.4 PG/ML (ref 0–450)
PLATELET # BLD AUTO: 254 10*3/MM3 (ref 140–450)
PMV BLD AUTO: 10 FL (ref 6–12)
POTASSIUM SERPL-SCNC: 3.9 MMOL/L (ref 3.5–5.2)
PROT SERPL-MCNC: 7.1 G/DL (ref 6–8.5)
QT INTERVAL: 392 MS
RBC # BLD AUTO: 5.01 10*6/MM3 (ref 3.77–5.28)
SODIUM SERPL-SCNC: 138 MMOL/L (ref 136–145)
TROPONIN T DELTA: NORMAL
TROPONIN T SERPL HS-MCNC: 9 NG/L
WBC NRBC COR # BLD AUTO: 9.24 10*3/MM3 (ref 3.4–10.8)

## 2024-11-08 PROCEDURE — 99284 EMERGENCY DEPT VISIT MOD MDM: CPT

## 2024-11-08 PROCEDURE — 85379 FIBRIN DEGRADATION QUANT: CPT | Performed by: PHYSICIAN ASSISTANT

## 2024-11-08 PROCEDURE — 93005 ELECTROCARDIOGRAM TRACING: CPT | Performed by: EMERGENCY MEDICINE

## 2024-11-08 PROCEDURE — 93005 ELECTROCARDIOGRAM TRACING: CPT

## 2024-11-08 PROCEDURE — 85025 COMPLETE CBC W/AUTO DIFF WBC: CPT | Performed by: PHYSICIAN ASSISTANT

## 2024-11-08 PROCEDURE — 71045 X-RAY EXAM CHEST 1 VIEW: CPT

## 2024-11-08 PROCEDURE — 83880 ASSAY OF NATRIURETIC PEPTIDE: CPT | Performed by: PHYSICIAN ASSISTANT

## 2024-11-08 PROCEDURE — 84484 ASSAY OF TROPONIN QUANT: CPT | Performed by: PHYSICIAN ASSISTANT

## 2024-11-08 PROCEDURE — 36415 COLL VENOUS BLD VENIPUNCTURE: CPT

## 2024-11-08 PROCEDURE — 80053 COMPREHEN METABOLIC PANEL: CPT | Performed by: PHYSICIAN ASSISTANT

## 2024-11-08 PROCEDURE — 83690 ASSAY OF LIPASE: CPT | Performed by: PHYSICIAN ASSISTANT

## 2024-11-09 RX ORDER — HYDROXYZINE HYDROCHLORIDE 25 MG/1
25 TABLET, FILM COATED ORAL EVERY 6 HOURS PRN
Qty: 20 TABLET | Refills: 0 | Status: SHIPPED | OUTPATIENT
Start: 2024-11-09 | End: 2024-11-14

## 2024-11-09 NOTE — ED PROVIDER NOTES
EMERGENCY DEPARTMENT ENCOUNTER    Pt Name: Elisabeth Bustos  MRN: [id number]  Pt :   1980  Room Number:    Date of encounter:  2024  PCP: Mariann Vega DO  ED Provider: NATHAN Parson    Historian: Patient    HPI:  Chief Complaint: Chest Pain, Shortness of breath    Context: Elisabeth Bustos is a 44 y.o. female who presents to the ED with concerns of increased heart rate and chest tightness, potentially linked to stress after visiting their father, who is hospitalized in the [place] due to a stroke. The patient reports feeling more winded than usual and noticed a heart rate in the high 90s to 100 bpm when walking, which is unusual for them. Blood pressure was noted to be 168/100 mmHg, which the patient states is higher than normal. The patient has a history of anxiety and a past pulmonary embolism in . Currently, they are not on any blood thinners or medications other than Tylenol. The patient denies fever, cough, nausea, vomiting, dizziness, or abnormal swelling. They report having a primary care physician, Dr. Vega at [facility], and cardiologist Dr. Green.   HPI     REVIEW OF SYSTEMS  A chief complaint appropriate review of systems was completed and is negative except as noted in the HPI.     PAST MEDICAL HISTORY  Past Medical History:   Diagnosis Date    Abnormal Pap smear of cervix ?    Patient reports atypical cells, but otherwise normal    Acid reflux     Anemia Lifetime    Anxiety     Bilateral pulmonary emboli     Significantly greater on the right than the left.Echocardiogram 2016, revealing an LVEF of 50% to 55% with a dilated RV and reduced function noted.Trace AI,mild MR, moderate TR with no RVSP of 60-65 mmHg. catheter placement to the right pulmonary artery 2016, x 24 hours.Her hypercoagulable workup was negative.Oral contraceptive use.        DVT (deep venous thrombosis)     H/O echocardiogram 2016    The RV appears less  dilated when compared to echo 16. The right ventricular systolic pressure 57 mmHg    Hypercoagulable state     Negative hypercoagulable profile.     Hypertension     Migraine headache     Multiple thyroid nodules 2022    Oral contraceptive use     Contraceptive use on discontinued    Thyroid nodule     Trauma 1999    Varicella Child       PAST SURGICAL HISTORY  Past Surgical History:   Procedure Laterality Date    BREAST AUGMENTATION       SECTION      CHOLECYSTECTOMY  2015    HERNIA REPAIR Bilateral     inguinal hernia - age 2    INGUINAL HERNIA REPAIR      age 13- unknown side. Mesh was placed.    LAPAROSCOPIC CHOLECYSTECTOMY  ?    LARYNGOSCOPY      Laser laryngoscopy. Hyoid trim    PLANTAR FASCIA SURGERY Left     PLANTAR FASCIA SURGERY Right     SALPINGECTOMY Bilateral     WISDOM TOOTH EXTRACTION         FAMILY HISTORY  Family History   Problem Relation Age of Onset    Atrial fibrillation Father     Diabetes Father     Hypertension Father     Heart failure Father     Clotting disorder Father     Deep vein thrombosis Father     Heart attack Mother     Diabetes Mother     Hypertension Mother     Heart failure Mother     Other Mother         Open heart surgery, heart rhythm problems    Deep vein thrombosis Mother     Deep vein thrombosis Maternal Grandmother     Breast cancer Neg Hx     Ovarian cancer Neg Hx     Uterine cancer Neg Hx     Colon cancer Neg Hx        SOCIAL HISTORY  Social History     Socioeconomic History    Marital status:    Tobacco Use    Smoking status: Former     Current packs/day: 0.00     Average packs/day: 1.5 packs/day for 10.0 years (15.0 ttl pk-yrs)     Types: Cigarettes     Start date: 2003     Quit date: 2013     Years since quittin.8    Smokeless tobacco: Never   Vaping Use    Vaping status: Never Used   Substance and Sexual Activity    Alcohol use: No    Drug use: Not Currently     Types: Marijuana     Comment: Early teens    Sexual  activity: Yes     Partners: Male     Birth control/protection: I.U.D.     Comment: Paragard - inserted 2016       ALLERGIES  Patient has no known allergies.    PHYSICAL EXAM  Physical Exam  GENERAL:   Appears in no acute distress.  HENT: Nares patent.  EYES: No scleral icterus.  CV: Regular rhythm, regular rate.  RESPIRATORY: Normal effort.  No audible wheezes, rales or rhonchi.  ABDOMEN: Soft, nontender  MUSCULOSKELETAL: No deformities.   NEURO: Alert, moves all extremities, follows commands.  SKIN: Warm, dry, no rash visualized.  PSYCH: Anxious  I have reviewed the triage vital signs and nursing notes.     LAB RESULTS  Results for orders placed or performed during the hospital encounter of 11/08/24   ECG 12 Lead Chest Pain    Collection Time: 11/08/24  7:27 PM   Result Value Ref Range    QT Interval 392 ms   Comprehensive Metabolic Panel    Collection Time: 11/08/24  7:40 PM    Specimen: Blood   Result Value Ref Range    Glucose 99 65 - 99 mg/dL    BUN 12 6 - 20 mg/dL    Creatinine 0.83 0.57 - 1.00 mg/dL    Sodium 138 136 - 145 mmol/L    Potassium 3.9 3.5 - 5.2 mmol/L    Chloride 103 98 - 107 mmol/L    CO2 28.0 22.0 - 29.0 mmol/L    Calcium 9.7 8.6 - 10.5 mg/dL    Total Protein 7.1 6.0 - 8.5 g/dL    Albumin 4.3 3.5 - 5.2 g/dL    ALT (SGPT) 14 1 - 33 U/L    AST (SGOT) 16 1 - 32 U/L    Alkaline Phosphatase 66 39 - 117 U/L    Total Bilirubin 0.3 0.0 - 1.2 mg/dL    Globulin 2.8 gm/dL    A/G Ratio 1.5 g/dL    BUN/Creatinine Ratio 14.5 7.0 - 25.0    Anion Gap 7.0 5.0 - 15.0 mmol/L    eGFR 88.2 >60.0 mL/min/1.73   Lipase    Collection Time: 11/08/24  7:40 PM    Specimen: Blood   Result Value Ref Range    Lipase 32 13 - 60 U/L   BNP    Collection Time: 11/08/24  7:40 PM    Specimen: Blood   Result Value Ref Range    proBNP 122.4 0.0 - 450.0 pg/mL   D-dimer, Quantitative    Collection Time: 11/08/24  7:40 PM    Specimen: Blood   Result Value Ref Range    D-Dimer, Quantitative <0.27 0.00 - 0.50 MCGFEU/mL   High Sensitivity  Troponin T    Collection Time: 11/08/24  7:40 PM    Specimen: Blood   Result Value Ref Range    HS Troponin T 9 <14 ng/L   CBC Auto Differential    Collection Time: 11/08/24  7:40 PM    Specimen: Blood   Result Value Ref Range    WBC 9.24 3.40 - 10.80 10*3/mm3    RBC 5.01 3.77 - 5.28 10*6/mm3    Hemoglobin 14.5 12.0 - 15.9 g/dL    Hematocrit 43.2 34.0 - 46.6 %    MCV 86.2 79.0 - 97.0 fL    MCH 28.9 26.6 - 33.0 pg    MCHC 33.6 31.5 - 35.7 g/dL    RDW 12.6 12.3 - 15.4 %    RDW-SD 39.7 37.0 - 54.0 fl    MPV 10.0 6.0 - 12.0 fL    Platelets 254 140 - 450 10*3/mm3    Neutrophil % 65.1 42.7 - 76.0 %    Lymphocyte % 26.7 19.6 - 45.3 %    Monocyte % 6.6 5.0 - 12.0 %    Eosinophil % 1.1 0.3 - 6.2 %    Basophil % 0.3 0.0 - 1.5 %    Immature Grans % 0.2 0.0 - 0.5 %    Neutrophils, Absolute 6.01 1.70 - 7.00 10*3/mm3    Lymphocytes, Absolute 2.47 0.70 - 3.10 10*3/mm3    Monocytes, Absolute 0.61 0.10 - 0.90 10*3/mm3    Eosinophils, Absolute 0.10 0.00 - 0.40 10*3/mm3    Basophils, Absolute 0.03 0.00 - 0.20 10*3/mm3    Immature Grans, Absolute 0.02 0.00 - 0.05 10*3/mm3    nRBC 0.0 0.0 - 0.2 /100 WBC   High Sensitivity Troponin T 2Hr    Collection Time: 11/08/24  9:51 PM    Specimen: Blood   Result Value Ref Range    HS Troponin T <6 <14 ng/L    Troponin T Delta         If labs were ordered, I independently reviewed the results and considered them in treating the patient.    RADIOLOGY  XR Chest 1 View   Final Result   1.No evidence for acute cardiopulmonary process.         Electronically Signed: Tien White MD     11/8/2024 8:14 PM EST     Workstation ID: [id number]        [x] Radiologist's Report Reviewed:  I ordered and independently interpreted the above noted radiographic studies.  See radiologist's dictation for official interpretation.      PROCEDURES    Procedures    ECG 12 Lead Chest Pain   Preliminary Result   Test Reason : Chest Pain   Blood Pressure :   */*   mmHG   Vent. Rate :  70 BPM     Atrial Rate :  70 BPM       P-R Int : 146 ms          QRS Dur :  84 ms       QT Int : 392 ms       P-R-T Axes :  34  65  46 degrees     QTcB Int : 423 ms      Normal sinus rhythm with sinus arrhythmia   Normal ECG   When compared with ECG of 04-Jan-2016 15:26,   No significant change was found      Referred By: EDMD           Confirmed By:           MEDICATIONS GIVEN IN ER    Medications - No data to display    MEDICAL DECISION MAKING, PROGRESS, and CONSULTS   Medical Decision Making  Vladimir is a nontoxic-appearing 44-year-old female who presents ER for evaluation of chest pain and shortness of breath.  Patient does note significantly increased stress as her father is currently hospitalized at the [facility] following a stroke.  No acute or emergent findings on physical exam.  Initial presentation demonstrated blood pressure elevated at 190/95, without intervention normalized prior to discharge with /92.  Labs without acute or emergent abnormalities.  Specifically initial and repeat high sensitive troponin within normal limits.  Patient with history of DVT and PE, negative D-dimer in ED this evening.  EKG without evidence of acute ischemic changes and chest x-ray demonstrates no acute cardiopulmonary process.  Vistaril sent to patient's pharmacy for anxiety.  Patient instructed on symptomatic care and outpatient follow-up with primary care which she reports that she is going to do in the next several days.  Return precautions provided.    Problems Addressed:  Atypical chest pain: complicated acute illness or injury  Elevated blood pressure reading with diagnosis of hypertension: complicated acute illness or injury  History of anxiety: complicated acute illness or injury  History of DVT (deep vein thrombosis): complicated acute illness or injury  History of pulmonary embolus (PE): complicated acute illness or injury    Amount and/or Complexity of Data Reviewed  Labs: ordered. Decision-making details documented in ED  Course.  Radiology: ordered and independent interpretation performed. Decision-making details documented in ED Course.  ECG/medicine tests: ordered and independent interpretation performed. Decision-making details documented in ED Course.    Risk  Prescription drug management.      Discussion below represents my analysis of pertinent findings related to patient's condition, differential diagnosis, treatment plan and final disposition.    Differential diagnosis:  The differential diagnosis associated with the patient's presentation includes: The patient may be experiencing symptoms related to anxiety or a panic attack, exacerbated by stress. However, given her history of PE, further evaluation is necessary to rule out cardiovascular or pulmonary issues.    Additional sources  Discussed/ obtained information from independent historians:   [] Spouse  [] Parent  [] Family member  [] Friend  [] EMS   [] Other:  External (non-ED) record review:   [] Inpatient record:   [] Office record:   [] Outpatient record:   [] Prior Outpatient labs:   [] Prior Outpatient radiology:   [] Primary Care record:   [] Outside ED record:   [x] Other: The patient has a history of pulmonary embolism (PE) in 2016 and anxiety.  Patient's care impacted by:   [] Diabetes  [] Hypertension  [] Hyperlipidemia  [] Hypothyroidism   [] Coronary Artery Disease   [] COPD   [] Cancer   [] Obesity  [] GERD   [] Tobacco Abuse   [] Substance Abuse    [] Anxiety   [] Depression   [x] Other: History of PE, DVT  Care significantly affected by Social Determinants of Health (housing and economic circumstances, unemployment)    [] Yes     [x] No   If yes, Patient's care significantly limited by  Social Determinants of Health including:   [] Inadequate housing   [] Low income   [] Alcoholism and drug addiction in family   [] Problems related to primary support group   [] Unemployment   [] Problems related to employment   [] Other Social Determinants of Health:      Orders placed during this visit:  Orders Placed This Encounter   Procedures    XR Chest 1 View    Comprehensive Metabolic Panel    Lipase    BNP    D-dimer, Quantitative    High Sensitivity Troponin T    CBC Auto Differential    High Sensitivity Troponin T 2Hr    ECG 12 Lead Chest Pain    CBC & Differential     ED Course:    ED Course as of 11/09/24 0154 Fri Nov 08, 2024 1933 Vitals and Telemetry tracing was reviewed and directly interpreted by myself demonstrating blood pressure 190/95, afebrile, heart rate of 73, respirations of 22 breaths/min and oxygen saturation 99% on room air [JG]   1933 BP(!): 190/95 [JG]   1933 Temp: 98.1 °F (36.7 °C) [JG]   1933 Heart Rate: 73 [JG]   1933 Resp: 22 [JG]   1933 SpO2: 99 % [JG]   2026 Imaging of chest personally interpreted by myself with official read provided by radiology demonstrated no acute cardiopulmonary process.   [JG]   2027 EKG personally interpreted by myself in addition to interpretation by attending without evidence of acute ischemic changes; sinus rhythm [JG]   2027 Initial labs studies were reviewed and directly interpreted by myself and demonstrated no acute abnormalities, specifically initial high-sensitivity troponin negative, negative D-dimer, BNP within normal limits. [JG]   2242 Labs studies were reviewed and directly interpreted by myself and demonstrated no acute findings.  Repeat high-sensitivity troponin within normal limits. [JG]   Sat Nov 09, 2024 0151 Repeat vital signs and telemetry tracing reviewed and directly interpreted by myself prior to discharge demonstrated blood pressure 158/92, heart rate 61, oxygen saturation 97% room air [JG]      ED Course User Index  [JG] Bronson Pardo, PA          DIAGNOSIS  Final diagnoses:   Atypical chest pain   Elevated blood pressure reading with diagnosis of hypertension   History of anxiety   History of DVT (deep vein thrombosis)   History of pulmonary embolus (PE)       DISPOSITION    ED  Disposition       ED Disposition   Discharge    Condition   Stable    Comment   --           DISCHARGE    Patient discharged in stable condition.    Reviewed implications of results, diagnosis, meds, responsibility to follow up, warning signs and symptoms of possible worsening, potential complications and reasons to return to ER.    Patient/Family voiced understanding of above instructions.    Discussed plan for discharge, as there is no emergent indication for admission.  Pt/family is agreeable and understands need for follow up and possible repeat testing.  Pt/family is aware that discharge does not mean that nothing is wrong but that it indicates no emergency is currently present that requires admission and they must continue care with follow-up as given below or with a physician of their choice.     FOLLOW-UP  Mariann Vega,   [address]  [place] [facility] [address] [phone]    Call   Call for follow up with primary care    [facility] EMERGENCY DEPARTMENT  [address] [facility] [address] [phone]  Go to   If symptoms worsen         Medication List        New Prescriptions      hydrOXYzine 25 MG tablet  Commonly known as: ATARAX  Take 1 tablet by mouth Every 6 (Six) Hours As Needed for Anxiety for up to 5 days.               Where to Get Your Medications        These medications were sent to [id number] - [place], [place] - [address] - [phone]  - [phone]   [address] [address], [facility] [address]      Phone: [phone]   hydrOXYzine 25 MG tablet        Please note that portions of this document were completed with voice recognition software.        Bronson Pardo PA  11/09/24 0154

## 2024-11-11 ENCOUNTER — TELEPHONE (OUTPATIENT)
Dept: FAMILY MEDICINE CLINIC | Facility: CLINIC | Age: 44
End: 2024-11-11
Payer: COMMERCIAL

## 2024-11-11 ENCOUNTER — PATIENT OUTREACH (OUTPATIENT)
Dept: CASE MANAGEMENT | Facility: OTHER | Age: 44
End: 2024-11-11
Payer: COMMERCIAL

## 2024-11-11 NOTE — OUTREACH NOTE
AMBULATORY CASE MANAGEMENT NOTE    Names and Relationships of Patient/Support Persons: Contact: Juli Elisabeth M; Relationship: Self -     Patient Outreach    Outreach call to pt seen at ED 11/8/24 for chest pain and elevated bp. Pt reports she monitors her bp at home and it normally runs 120-130s/80s and she is currently on no bp meds. Discussed pcp f/u and she is working on getting a telehealth visit scheduled however pcp is currently out until Wednesday so she is having to wait until then. She denied further needs at this time and voiced appreciation for the call.    Nivia SALAS  Ambulatory Case Management    11/11/2024, 16:28 EST

## 2024-11-11 NOTE — TELEPHONE ENCOUNTER
PATIENT WENT TO ER FOR HIGH BP AND TIGHTNESS IN CHEST, SHE WAS SEEN AT [facility]. PATIENT IS ALSO HAVING TO STAY IN [place] TO TAKE CARE OF HER MOTHER. SHE'S WANTING TO KNOW IF DR. HARPER WOULD BE OKAY WITH HER DOING A MYCHART VISIT.

## 2024-11-12 LAB — QT INTERVAL: 392 MS

## 2024-11-14 ENCOUNTER — TELEMEDICINE (OUTPATIENT)
Dept: FAMILY MEDICINE CLINIC | Facility: CLINIC | Age: 44
End: 2024-11-14
Payer: COMMERCIAL

## 2024-11-14 DIAGNOSIS — F41.9 ANXIETY: ICD-10-CM

## 2024-11-14 DIAGNOSIS — I10 HYPERTENSION, UNSPECIFIED TYPE: Primary | ICD-10-CM

## 2024-11-14 PROCEDURE — 99214 OFFICE O/P EST MOD 30 MIN: CPT | Performed by: FAMILY MEDICINE

## 2024-11-14 RX ORDER — LISINOPRIL AND HYDROCHLOROTHIAZIDE 10; 12.5 MG/1; MG/1
1 TABLET ORAL DAILY
Qty: 30 TABLET | Refills: 1 | Status: SHIPPED | OUTPATIENT
Start: 2024-11-14

## 2024-11-14 RX ORDER — BUSPIRONE HYDROCHLORIDE 7.5 MG/1
7.5 TABLET ORAL 2 TIMES DAILY
Qty: 60 TABLET | Refills: 1 | Status: SHIPPED | OUTPATIENT
Start: 2024-11-14

## 2024-11-14 NOTE — PROGRESS NOTES
Chief Complaint  Discuss BP (Checks BP at home using arm cuff Tues 151/83 P78, Wed 146/90 P63, Thurs 145/86 P63)  Mode of Visit: Video  Location of patient: -OTHER-: [place]  Location of provider: +[facility]+  You have chosen to receive care through a telehealth visit.  The patient has signed the video visit consent form.  The visit included audio and video interaction. No technical issues occurred during this visit.  Subjective          Elisabeth Bustos presents to [facility]  History of Present Illness  She was evaluated at 11/8/24 at  ER due to difficulty breathing while visit her father at hospital.  She was on the verge of a panic attack and blood pressure was significantly elevated.  Medication to treat blood pressure was not started at this ER visit, due to likely being related to stress levels.  She has since been monitoring her blood pressure, with elevated readings at home.  Her stress level remains elevated.  At ER, blood pressure was 195/100    Her father suffered a stroke in September and had a complicated hospital stay.  He has since had significant improvement and is in a rehab facility.  She is unsure if to stay there with the 7 or 14 days, waiting to hear from insurance approval.  Once he is discharged from the, she will likely be his caretaker while he continues to recover.  Her mother has dementia and her father was her caretaker close since his stroke, Elisabeth has taken on her mother's caretaker role.  She is still trying to work, too.     Anxiety has been significantly increased. Requesting to start treatment.    The following portions of the patient's history were reviewed and updated as appropriate: allergies, current medications, past family history, past medical history, past social history, past surgical history, and problem list.    Objective      Physical Exam  HENT:      Head: Normocephalic.   Pulmonary:      Effort: Pulmonary effort is  normal. No respiratory distress.   Neurological:      Mental Status: She is alert.   Psychiatric:         Mood and Affect: Affect is tearful.        Result Review :                Assessment and Plan    Diagnoses and all orders for this visit:    1. Hypertension, unspecified type (Primary)  -     lisinopril-hydrochlorothiazide (Zestoretic) 10-12.5 MG per tablet; Take 1 tablet by mouth Daily.  Dispense: 30 tablet; Refill: 1    2. Anxiety  -     busPIRone (BUSPAR) 7.5 MG tablet; Take 1 tablet by mouth 2 (Two) Times a Day.  Dispense: 60 tablet; Refill: 1    Start lisinopril hydrochlorothiazide to treat hypertension.  Continue monitoring routinely.  Follow-up in 4 weeks.  Start buspirone to improve anxiety.    I spent 25 minutes caring for Elisabeth on this date of service. This time includes time spent by me in the following activities:obtaining and/or reviewing a separately obtained history, performing a medically appropriate examination and/or evaluation , ordering medications, tests, or procedures, and documenting information in the medical record  Follow Up   Return in about 4 weeks (around 12/12/2024) for Recheck.  Patient was given instructions and counseling regarding her condition or for health maintenance advice. Please see specific information pulled into the AVS if appropriate.

## 2024-11-18 ENCOUNTER — OUTSIDE FACILITY SERVICE (OUTPATIENT)
Dept: GASTROENTEROLOGY | Facility: CLINIC | Age: 44
End: 2024-11-18
Payer: COMMERCIAL

## 2024-11-18 PROCEDURE — 45380 COLONOSCOPY AND BIOPSY: CPT | Performed by: INTERNAL MEDICINE

## 2024-11-18 PROCEDURE — 88305 TISSUE EXAM BY PATHOLOGIST: CPT | Performed by: INTERNAL MEDICINE

## 2024-11-18 PROCEDURE — 45385 COLONOSCOPY W/LESION REMOVAL: CPT | Performed by: INTERNAL MEDICINE

## 2024-11-19 ENCOUNTER — LAB REQUISITION (OUTPATIENT)
Dept: LAB | Facility: HOSPITAL | Age: 44
End: 2024-11-19
Payer: COMMERCIAL

## 2024-11-19 DIAGNOSIS — K62.89 HYPERTROPHIED ANAL PAPILLA: Primary | ICD-10-CM

## 2024-11-19 DIAGNOSIS — Z12.11 ENCOUNTER FOR SCREENING FOR MALIGNANT NEOPLASM OF COLON: ICD-10-CM

## 2024-11-20 LAB
CYTO UR: NORMAL
LAB AP CASE REPORT: NORMAL
LAB AP CLINICAL INFORMATION: NORMAL
PATH REPORT.FINAL DX SPEC: NORMAL
PATH REPORT.GROSS SPEC: NORMAL

## 2024-11-20 NOTE — PROGRESS NOTES
Please let Elisabeth know she had an adenoma. Follow upo in 5 years was recommended.  The referral to Dr. Foley was sent also.

## 2024-11-20 NOTE — PROGRESS NOTES
Naifm sent results to [facility] and mailed results.  Sent [employer] message about referral appointment to .

## 2024-12-12 ENCOUNTER — TELEMEDICINE (OUTPATIENT)
Dept: FAMILY MEDICINE CLINIC | Facility: CLINIC | Age: 44
End: 2024-12-12
Payer: COMMERCIAL

## 2024-12-12 DIAGNOSIS — I10 HYPERTENSION, UNSPECIFIED TYPE: ICD-10-CM

## 2024-12-12 DIAGNOSIS — Z12.31 ENCOUNTER FOR SCREENING MAMMOGRAM FOR MALIGNANT NEOPLASM OF BREAST: Primary | ICD-10-CM

## 2024-12-12 DIAGNOSIS — F41.9 ANXIETY: ICD-10-CM

## 2024-12-12 PROCEDURE — 99214 OFFICE O/P EST MOD 30 MIN: CPT | Performed by: FAMILY MEDICINE

## 2024-12-12 RX ORDER — LISINOPRIL AND HYDROCHLOROTHIAZIDE 10; 12.5 MG/1; MG/1
1 TABLET ORAL DAILY
Qty: 90 TABLET | Refills: 1 | Status: SHIPPED | OUTPATIENT
Start: 2024-12-12

## 2024-12-12 RX ORDER — BUSPIRONE HYDROCHLORIDE 7.5 MG/1
7.5 TABLET ORAL 3 TIMES DAILY
Qty: 90 TABLET | Refills: 2 | Status: SHIPPED | OUTPATIENT
Start: 2024-12-12

## 2024-12-12 NOTE — PROGRESS NOTES
Chief Complaint  1mo f/u HTN/Anxiety  Mode of Visit: Video  Location of patient: -HOME-  Location of provider: +[facility]+  You have chosen to receive care through a telehealth visit.  The patient has signed the video visit consent form.  The visit included audio and video interaction. No technical issues occurred during this visit.  Deyanira Bustos presents to [facility]  History of Present Illness  HTN  Readings staying around 130s/80s  Taking lisinopril-HCTZ 10-12.5 mg  Feeling better    Her dad returned home today, suffered a stroke in November. 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   Taking buspirone to treat anxiety. This has relieved anxiety symptoms, no longer feels like an elephant is sitting on her chest. She has taken increased dose a few times, which has been helpful.       The following portions of the patient's history were reviewed and updated as appropriate: allergies, current medications, past family history, past medical history, past social history, past surgical history, and problem list.    Objective      Physical Exam  HENT:      Head: Normocephalic.   Pulmonary:      Effort: Pulmonary effort is normal. No respiratory distress.   Neurological:      Mental Status: She is alert.   Psychiatric:         Mood and Affect: Mood normal.        Result Review :                Assessment and Plan    Diagnoses and all orders for this visit:    1. Encounter for screening mammogram for malignant neoplasm of breast (Primary)  -     Mammo Screening Digital Tomosynthesis Bilateral With CAD; Future    2. Anxiety  -     busPIRone (BUSPAR) 7.5 MG tablet; Take 1 tablet by mouth 3 (Three) Times a Day.  Dispense: 90 tablet; Refill: 2    3. Hypertension, unspecified type  -     lisinopril-hydrochlorothiazide (Zestoretic) 10-12.5 MG per tablet; Take 1 tablet by mouth Daily.  Dispense: 90 tablet; Refill: 1    Will increase buspirone dose to 7.5 mg TID as needed.    Blood pressure controlled with current treatment, medication refilled.     I spent 15 minutes caring for Elisabeth on this date of service. This time includes time spent by me in the following activities:performing a medically appropriate examination and/or evaluation , ordering medications, tests, or procedures, and documenting information in the medical record  Follow Up   No follow-ups on file.  Patient was given instructions and counseling regarding her condition or for health maintenance advice. Please see specific information pulled into the AVS if appropriate.

## 2024-12-23 ENCOUNTER — TELEPHONE (OUTPATIENT)
Dept: FAMILY MEDICINE CLINIC | Facility: CLINIC | Age: 44
End: 2024-12-23
Payer: COMMERCIAL

## 2024-12-23 NOTE — TELEPHONE ENCOUNTER
PATIENT CALLED IN AND IS REQUESTING TO START THE WELLBUTRIN BACK. SHE STATED HER AND DR HARPER HAD SPOKEN ABOUT THIS AT HER LAST APPT. PATIENT STATED SHE FEELS LIKE SHE NEEDS TO GET BACK ON THE MEDICATION. PLEASE CONTACT PATIENT BACK

## 2025-01-02 RX ORDER — BUPROPION HYDROCHLORIDE 150 MG/1
150 TABLET ORAL EVERY MORNING
Qty: 90 TABLET | Refills: 0 | Status: SHIPPED | OUTPATIENT
Start: 2025-01-02

## 2025-07-22 ENCOUNTER — APPOINTMENT (OUTPATIENT)
Facility: HOSPITAL | Age: 45
End: 2025-07-22
Payer: COMMERCIAL

## 2025-07-22 ENCOUNTER — HOSPITAL ENCOUNTER (EMERGENCY)
Facility: HOSPITAL | Age: 45
Discharge: HOME | End: 2025-07-22
Attending: EMERGENCY MEDICINE | Admitting: EMERGENCY MEDICINE
Payer: COMMERCIAL

## 2025-07-22 VITALS
BODY MASS INDEX: 37.38 KG/M2 | TEMPERATURE: 97.8 F | RESPIRATION RATE: 16 BRPM | SYSTOLIC BLOOD PRESSURE: 137 MMHG | HEIGHT: 69 IN | OXYGEN SATURATION: 97 % | HEART RATE: 62 BPM | WEIGHT: 252.4 LBS | DIASTOLIC BLOOD PRESSURE: 86 MMHG

## 2025-07-22 DIAGNOSIS — F41.9 ANXIETY: ICD-10-CM

## 2025-07-22 DIAGNOSIS — R07.9 CHEST PAIN, UNSPECIFIED TYPE: Primary | ICD-10-CM

## 2025-07-22 LAB
ALBUMIN SERPL-MCNC: 4.1 G/DL (ref 3.5–5.2)
ALBUMIN/GLOB SERPL: 1.5 G/DL
ALP SERPL-CCNC: 66 U/L (ref 39–117)
ALT SERPL W P-5'-P-CCNC: 21 U/L (ref 1–33)
ANION GAP SERPL CALCULATED.3IONS-SCNC: 11.8 MMOL/L (ref 5–15)
AST SERPL-CCNC: 25 U/L (ref 1–32)
BASOPHILS # BLD AUTO: 0.02 10*3/MM3 (ref 0–0.2)
BASOPHILS NFR BLD AUTO: 0.2 % (ref 0–1.5)
BILIRUB SERPL-MCNC: 0.4 MG/DL (ref 0–1.2)
BILIRUB UR QL STRIP: NEGATIVE
BUN SERPL-MCNC: 14.8 MG/DL (ref 6–20)
BUN/CREAT SERPL: 17.4 (ref 7–25)
CALCIUM SPEC-SCNC: 9.6 MG/DL (ref 8.6–10.5)
CHLORIDE SERPL-SCNC: 103 MMOL/L (ref 98–107)
CLARITY UR: CLEAR
CO2 SERPL-SCNC: 24.2 MMOL/L (ref 22–29)
COLOR UR: YELLOW
CREAT SERPL-MCNC: 0.85 MG/DL (ref 0.57–1)
D DIMER PPP FEU-MCNC: <0.27 MCGFEU/ML (ref 0–0.5)
DEPRECATED RDW RBC AUTO: 41.8 FL (ref 37–54)
EGFRCR SERPLBLD CKD-EPI 2021: 85.2 ML/MIN/1.73
EOSINOPHIL # BLD AUTO: 0.07 10*3/MM3 (ref 0–0.4)
EOSINOPHIL NFR BLD AUTO: 0.8 % (ref 0.3–6.2)
ERYTHROCYTE [DISTWIDTH] IN BLOOD BY AUTOMATED COUNT: 12.8 % (ref 12.3–15.4)
GEN 5 1HR TROPONIN T REFLEX: 6 NG/L
GLOBULIN UR ELPH-MCNC: 2.7 GM/DL
GLUCOSE SERPL-MCNC: 100 MG/DL (ref 65–99)
GLUCOSE UR STRIP-MCNC: NEGATIVE MG/DL
HCT VFR BLD AUTO: 44 % (ref 34–46.6)
HGB BLD-MCNC: 14.6 G/DL (ref 12–15.9)
HGB UR QL STRIP.AUTO: NEGATIVE
HOLD SPECIMEN: NORMAL
IMM GRANULOCYTES # BLD AUTO: 0.02 10*3/MM3 (ref 0–0.05)
IMM GRANULOCYTES NFR BLD AUTO: 0.2 % (ref 0–0.5)
KETONES UR QL STRIP: NEGATIVE
LEUKOCYTE ESTERASE UR QL STRIP.AUTO: NEGATIVE
LIPASE SERPL-CCNC: 30 U/L (ref 13–60)
LYMPHOCYTES # BLD AUTO: 2.23 10*3/MM3 (ref 0.7–3.1)
LYMPHOCYTES NFR BLD AUTO: 24.3 % (ref 19.6–45.3)
MCH RBC QN AUTO: 29 PG (ref 26.6–33)
MCHC RBC AUTO-ENTMCNC: 33.2 G/DL (ref 31.5–35.7)
MCV RBC AUTO: 87.5 FL (ref 79–97)
MONOCYTES # BLD AUTO: 0.64 10*3/MM3 (ref 0.1–0.9)
MONOCYTES NFR BLD AUTO: 7 % (ref 5–12)
NEUTROPHILS NFR BLD AUTO: 6.2 10*3/MM3 (ref 1.7–7)
NEUTROPHILS NFR BLD AUTO: 67.5 % (ref 42.7–76)
NITRITE UR QL STRIP: NEGATIVE
NT-PROBNP SERPL-MCNC: 69.2 PG/ML (ref 0–450)
PH UR STRIP.AUTO: 7 [PH] (ref 5–8)
PLATELET # BLD AUTO: 204 10*3/MM3 (ref 140–450)
PMV BLD AUTO: 9.8 FL (ref 6–12)
POTASSIUM SERPL-SCNC: 4.2 MMOL/L (ref 3.5–5.2)
PROT SERPL-MCNC: 6.8 G/DL (ref 6–8.5)
PROT UR QL STRIP: NEGATIVE
RBC # BLD AUTO: 5.03 10*6/MM3 (ref 3.77–5.28)
SODIUM SERPL-SCNC: 139 MMOL/L (ref 136–145)
SP GR UR STRIP: 1.02 (ref 1–1.03)
TROPONIN T NUMERIC DELTA: -1 NG/L
TROPONIN T SERPL HS-MCNC: 7 NG/L
UROBILINOGEN UR QL STRIP: NORMAL
WBC NRBC COR # BLD AUTO: 9.18 10*3/MM3 (ref 3.4–10.8)
WHOLE BLOOD HOLD COAG: NORMAL
WHOLE BLOOD HOLD SPECIMEN: NORMAL

## 2025-07-22 PROCEDURE — 71045 X-RAY EXAM CHEST 1 VIEW: CPT

## 2025-07-22 PROCEDURE — 80053 COMPREHEN METABOLIC PANEL: CPT | Performed by: EMERGENCY MEDICINE

## 2025-07-22 PROCEDURE — 83880 ASSAY OF NATRIURETIC PEPTIDE: CPT | Performed by: EMERGENCY MEDICINE

## 2025-07-22 PROCEDURE — 36415 COLL VENOUS BLD VENIPUNCTURE: CPT

## 2025-07-22 PROCEDURE — 81003 URINALYSIS AUTO W/O SCOPE: CPT

## 2025-07-22 PROCEDURE — 85379 FIBRIN DEGRADATION QUANT: CPT

## 2025-07-22 PROCEDURE — 93005 ELECTROCARDIOGRAM TRACING: CPT

## 2025-07-22 PROCEDURE — 83690 ASSAY OF LIPASE: CPT | Performed by: EMERGENCY MEDICINE

## 2025-07-22 PROCEDURE — 85025 COMPLETE CBC W/AUTO DIFF WBC: CPT | Performed by: EMERGENCY MEDICINE

## 2025-07-22 PROCEDURE — 84484 ASSAY OF TROPONIN QUANT: CPT | Performed by: EMERGENCY MEDICINE

## 2025-07-22 PROCEDURE — 99284 EMERGENCY DEPT VISIT MOD MDM: CPT | Performed by: EMERGENCY MEDICINE

## 2025-07-22 PROCEDURE — 93005 ELECTROCARDIOGRAM TRACING: CPT | Performed by: EMERGENCY MEDICINE

## 2025-07-22 RX ORDER — ASPIRIN 81 MG/1
324 TABLET, CHEWABLE ORAL ONCE
Status: COMPLETED | OUTPATIENT
Start: 2025-07-22 | End: 2025-07-22

## 2025-07-22 RX ORDER — SODIUM CHLORIDE 0.9 % (FLUSH) 0.9 %
10 SYRINGE (ML) INJECTION AS NEEDED
Status: DISCONTINUED | OUTPATIENT
Start: 2025-07-22 | End: 2025-07-23 | Stop reason: HOSPADM

## 2025-07-22 RX ADMIN — ASPIRIN 81 MG CHEWABLE TABLET 324 MG: 81 TABLET CHEWABLE at 22:01

## 2025-07-23 NOTE — FSED PROVIDER NOTE
[facility]    EMERGENCY DEPARTMENT ENCOUNTER      Pt Name: Elisabeth Bustos  MRN: [id number]  YOB: 1980  Date of evaluation: 7/22/2025  Provider: Lolita Mejia PA-C    CHIEF COMPLAINT       Chief Complaint   Patient presents with    Chest Pain       HISTORY OF PRESENT ILLNESS  (Location/Symptom, Timing/Onset, Context/Setting, Quality, Duration, Modifying Factors, Severity.)   Elisabeth Bustos is a 45 y.o. female who presents to the emergency department with chest pain.  Patient states that today while at work she began having pressure in the middle of her chest.  Patient states that the pain would come and go, and that at approximately 6 PM she started feeling a flushed rise of her neck into her face like it does when her blood pressure is elevated, and she went to [facility] where she had her blood pressure taken and it was reading 180/90.  Patient states she cannot recall if she took her lisinopril yesterday or today.  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  Patient denies shortness of breath, abdominal pain, nausea, vomiting, loss of consciousness.  Patient states that she does have a history of pulmonary emboli, as well as anxiety, stating that when all of this began she started thinking that she had another pulmonary emboli and sent herself and what she calls a panic attack.    HPI   Nursing notes were reviewed.  REVIEW OF SYSTEMS    (2-9 systems for level 4, 10 or more for level 5)   Review of Systems   Constitutional: Negative.    HENT: Negative.     Eyes: Negative.    Respiratory: Negative.     Cardiovascular:  Positive for chest pain.   Gastrointestinal: Negative.    Endocrine: Negative.    Genitourinary: Negative.    Musculoskeletal: Negative.    Skin: Negative.    Allergic/Immunologic: Negative.    Neurological: Negative.    Hematological: Negative.     Psychiatric/Behavioral: Negative.          All systems reviewed and negative except for those discussed in HPI.   PAST MEDICAL HISTORY     Past Medical History:   Diagnosis Date    Abnormal Pap smear of cervix ?    Patient reports atypical cells, but otherwise normal    Acid reflux     Anemia Lifetime    Anxiety     Bilateral pulmonary emboli     Significantly greater on the right than the left.Echocardiogram 2016, revealing an LVEF of 50% to 55% with a dilated RV and reduced function noted.Trace AI,mild MR, moderate TR with no RVSP of 60-65 mmHg. catheter placement to the right pulmonary artery 2016, x 24 hours.Her hypercoagulable workup was negative.Oral contraceptive use.        DVT (deep venous thrombosis)     H/O echocardiogram 2016    The RV appears less dilated when compared to echo 16. The right ventricular systolic pressure 57 mmHg    Hypercoagulable state     Negative hypercoagulable profile.     Hypertension     Migraine headache     Multiple thyroid nodules 2022    Oral contraceptive use     Contraceptive use on discontinued    Thyroid nodule     Trauma     Varicella Child       SURGICAL HISTORY       Past Surgical History:   Procedure Laterality Date    BREAST AUGMENTATION       SECTION      CHOLECYSTECTOMY  2015    HERNIA REPAIR Bilateral     inguinal hernia - age 2    INGUINAL HERNIA REPAIR      age 13- unknown side. Mesh was placed.    LAPAROSCOPIC CHOLECYSTECTOMY  ?    LARYNGOSCOPY      Laser laryngoscopy. Hyoid trim    PLANTAR FASCIA SURGERY Left     PLANTAR FASCIA SURGERY Right     SALPINGECTOMY Bilateral     WISDOM TOOTH EXTRACTION         CURRENT MEDICATIONS       Current Facility-Administered Medications:     sodium chloride 0.9 % flush 10 mL, 10 mL, Intravenous, PRN, Mynor Clemens, DO    Current Outpatient Medications:     buPROPion XL (Wellbutrin XL) 150 MG 24 hr tablet, Take 1 tablet by mouth Every Morning., Disp: 90 tablet,  Rfl: 0    busPIRone (BUSPAR) 7.5 MG tablet, Take 1 tablet by mouth 3 (Three) Times a Day., Disp: 90 tablet, Rfl: 2    EPINEPHrine (EpiPen 2-Abdirahman) 0.3 MG/0.3ML solution auto-injector injection, Use x 1 if needed for sting reaction, Disp: 1 each, Rfl: 2    lisinopril-hydrochlorothiazide (Zestoretic) 10-12.5 MG per tablet, Take 1 tablet by mouth Daily., Disp: 90 tablet, Rfl: 1    ALLERGIES     Patient has no known allergies.    FAMILY HISTORY       Family History   Problem Relation Age of Onset    Atrial fibrillation Father     Diabetes Father     Hypertension Father     Heart failure Father     Clotting disorder Father     Deep vein thrombosis Father     Heart attack Mother     Diabetes Mother     Hypertension Mother     Heart failure Mother     Other Mother         Open heart surgery, heart rhythm problems    Deep vein thrombosis Mother     Deep vein thrombosis Maternal Grandmother     Breast cancer Neg Hx     Ovarian cancer Neg Hx     Uterine cancer Neg Hx     Colon cancer Neg Hx         SOCIAL HISTORY       Social History     Socioeconomic History    Marital status:    Tobacco Use    Smoking status: Former     Current packs/day: 0.00     Average packs/day: 1.5 packs/day for 10.0 years (15.0 ttl pk-yrs)     Types: Cigarettes     Start date: 2003     Quit date: 2013     Years since quittin.5    Smokeless tobacco: Never   Vaping Use    Vaping status: Never Used   Substance and Sexual Activity    Alcohol use: No    Drug use: Not Currently     Types: Marijuana     Comment: Early teens    Sexual activity: Yes     Partners: Male     Birth control/protection: I.U.D.     Comment: Paragard - inserted 2016       PHYSICAL EXAM    (up to 7 for level 4, 8 or more for level 5)   Physical Exam  Constitutional:       Appearance: She is well-developed.   HENT:      Head: Normocephalic and atraumatic.   Eyes:      Pupils: Pupils are equal, round, and reactive to light.   Cardiovascular:      Rate and Rhythm:  Normal rate and regular rhythm.      Heart sounds: Normal heart sounds.   Pulmonary:      Effort: Pulmonary effort is normal.      Breath sounds: Normal breath sounds.   Chest:      Chest wall: No mass or tenderness.   Abdominal:      General: Bowel sounds are normal.      Palpations: Abdomen is soft.   Musculoskeletal:         General: Normal range of motion.      Cervical back: Normal range of motion.   Skin:     General: Skin is warm and dry.   Neurological:      General: No focal deficit present.      Mental Status: She is alert.   Psychiatric:         Mood and Affect: Mood normal.         Behavior: Behavior normal.          DIAGNOSTIC RESULTS     EKG: All EKGs are interpreted by the Emergency Department Physician who either signs or Co-signs this chart in the absence of a cardiologist.    ECG 12 Lead Chest Pain   Preliminary Result   Test Reason : Chest Pain   Blood Pressure :   */*   mmHG   Vent. Rate :  61 BPM     Atrial Rate :  61 BPM      P-R Int : 148 ms          QRS Dur :  80 ms       QT Int : 398 ms       P-R-T Axes :  62  48  45 degrees     QTcB Int : 400 ms      Normal sinus rhythm   Normal ECG   When compared with ECG of 22-Jul-2025 21:15, (Unconfirmed)   No significant change was found      Referred By:            Confirmed By:       ECG 12 Lead Chest Pain   Preliminary Result   Test Reason : Chest Pain   Blood Pressure :   */*   mmHG   Vent. Rate :  65 BPM     Atrial Rate :  65 BPM      P-R Int : 140 ms          QRS Dur :  80 ms       QT Int : 378 ms       P-R-T Axes :  58  55  55 degrees     QTcB Int : 393 ms      Normal sinus rhythm   Normal ECG   When compared with ECG of 08-Nov-2024 19:27,   No significant change was found      Referred By:            Confirmed By:           RADIOLOGY:   Non-plain film images such as CT, Ultrasound and MRI are read by the radiologist. Plain radiographic images are visualized and preliminarily interpreted by the emergency physician with the below findings:    [x]  Radiologist's Report Reviewed:  XR Chest 1 View   Final Result   No active disease.            Electronically Signed: Jayden Donaldson MD     7/22/2025 10:02 PM EDT     Workstation ID: [id number]          ED BEDSIDE ULTRASOUND:   Performed by ED Physician - none    LABS:    I have reviewed and interpreted all of the currently available lab results from this visit (if applicable):  Results for orders placed or performed during the hospital encounter of 07/22/25   ECG 12 Lead Chest Pain    Collection Time: 07/22/25  9:15 PM   Result Value Ref Range    QT Interval 378 ms    QTC Interval 393 ms   High Sensitivity Troponin T    Collection Time: 07/22/25  9:30 PM    Specimen: Blood   Result Value Ref Range    HS Troponin T 7 <14 ng/L   Comprehensive Metabolic Panel    Collection Time: 07/22/25  9:30 PM    Specimen: Blood   Result Value Ref Range    Glucose 100 (H) 65 - 99 mg/dL    BUN 14.8 6.0 - 20.0 mg/dL    Creatinine 0.85 0.57 - 1.00 mg/dL    Sodium 139 136 - 145 mmol/L    Potassium 4.2 3.5 - 5.2 mmol/L    Chloride 103 98 - 107 mmol/L    CO2 24.2 22.0 - 29.0 mmol/L    Calcium 9.6 8.6 - 10.5 mg/dL    Total Protein 6.8 6.0 - 8.5 g/dL    Albumin 4.1 3.5 - 5.2 g/dL    ALT (SGPT) 21 1 - 33 U/L    AST (SGOT) 25 1 - 32 U/L    Alkaline Phosphatase 66 39 - 117 U/L    Total Bilirubin 0.4 0.0 - 1.2 mg/dL    Globulin 2.7 gm/dL    A/G Ratio 1.5 g/dL    BUN/Creatinine Ratio 17.4 7.0 - 25.0    Anion Gap 11.8 5.0 - 15.0 mmol/L    eGFR 85.2 >60.0 mL/min/1.73   Lipase    Collection Time: 07/22/25  9:30 PM    Specimen: Blood   Result Value Ref Range    Lipase 30 13 - 60 U/L   BNP    Collection Time: 07/22/25  9:30 PM    Specimen: Blood   Result Value Ref Range    proBNP 69.2 0.0 - 450.0 pg/mL   CBC Auto Differential    Collection Time: 07/22/25  9:30 PM    Specimen: Blood   Result Value Ref Range    WBC 9.18 3.40 - 10.80 10*3/mm3    RBC 5.03 3.77 - 5.28 10*6/mm3    Hemoglobin 14.6 12.0 - 15.9 g/dL    Hematocrit 44.0 34.0 - 46.6 %    MCV  87.5 79.0 - 97.0 fL    MCH 29.0 26.6 - 33.0 pg    MCHC 33.2 31.5 - 35.7 g/dL    RDW 12.8 12.3 - 15.4 %    RDW-SD 41.8 37.0 - 54.0 fl    MPV 9.8 6.0 - 12.0 fL    Platelets 204 140 - 450 10*3/mm3    Neutrophil % 67.5 42.7 - 76.0 %    Lymphocyte % 24.3 19.6 - 45.3 %    Monocyte % 7.0 5.0 - 12.0 %    Eosinophil % 0.8 0.3 - 6.2 %    Basophil % 0.2 0.0 - 1.5 %    Immature Grans % 0.2 0.0 - 0.5 %    Neutrophils, Absolute 6.20 1.70 - 7.00 10*3/mm3    Lymphocytes, Absolute 2.23 0.70 - 3.10 10*3/mm3    Monocytes, Absolute 0.64 0.10 - 0.90 10*3/mm3    Eosinophils, Absolute 0.07 0.00 - 0.40 10*3/mm3    Basophils, Absolute 0.02 0.00 - 0.20 10*3/mm3    Immature Grans, Absolute 0.02 0.00 - 0.05 10*3/mm3   D-dimer, Quantitative    Collection Time: 07/22/25  9:30 PM    Specimen: Blood   Result Value Ref Range    D-Dimer, Quantitative <0.27 0.00 - 0.50 MCGFEU/mL   Green Top (Gel)    Collection Time: 07/22/25  9:30 PM   Result Value Ref Range    Extra Tube Hold for add-ons.    Lavender Top    Collection Time: 07/22/25  9:30 PM   Result Value Ref Range    Extra Tube hold for add-on    Gold Top - SST    Collection Time: 07/22/25  9:30 PM   Result Value Ref Range    Extra Tube Hold for add-ons.    Gray Top    Collection Time: 07/22/25  9:30 PM   Result Value Ref Range    Extra Tube Hold for add-ons.    Light Blue Top    Collection Time: 07/22/25  9:30 PM   Result Value Ref Range    Extra Tube Hold for add-ons.    Urinalysis With Microscopic If Indicated (No Culture) - Urine, Clean Catch    Collection Time: 07/22/25 10:02 PM    Specimen: Urine, Clean Catch   Result Value Ref Range    Color, UA Yellow Yellow, Straw    Appearance, UA Clear Clear    pH, UA 7.0 5.0 - 8.0    Specific Gravity, UA 1.020 1.005 - 1.030    Glucose, UA Negative Negative    Ketones, UA Negative Negative    Bilirubin, UA Negative Negative    Blood, UA Negative Negative    Protein, UA Negative Negative    Leuk Esterase, UA Negative Negative    Nitrite, UA Negative  "Negative    Urobilinogen, UA 0.2 E.U./dL 0.2 - 1.0 E.U./dL   ECG 12 Lead Chest Pain    Collection Time: 07/22/25 10:29 PM   Result Value Ref Range    QT Interval 398 ms    QTC Interval 400 ms   High Sensitivity Troponin T 1Hr    Collection Time: 07/22/25 10:35 PM    Specimen: Blood   Result Value Ref Range    HS Troponin T 6 <14 ng/L    Troponin T Numeric Delta -1 Abnormal if >/=3 ng/L        All other labs were within normal range or not returned as of this dictation.    EMERGENCY DEPARTMENT COURSE and DIFFERENTIAL DIAGNOSIS/MDM:   Vitals:    Vitals:    07/22/25 2230 07/22/25 2300 07/22/25 2315 07/22/25 2330   BP:  137/86     BP Location:       Patient Position:       Pulse: 62 58 58 62   Resp:       Temp:       TempSrc:       SpO2: 97% 96% 96% 97%   Weight:       Height:                    DIFFERENTIAL DIAGNOSIS    Differential diagnosis considered in the evaluation and treatment of patient include but not limited to: Unstable angina, anxiety, pulmonary emboli    MDM  Patient presents to emergency department complaints of chest pain.  Differential diagnosis as above.  Patient states that having chest pressure while at work, and that due to her past medical history thinking the worst, stating \"I have anxiety, my chest pain started I get very anxious that it could be something bad like it has been in the past.\"  Patient rates her trans pressure in emergency department a 4 out of 10 stating \"I can breathe finally.  \"Patient states that she takes lisinopril, Wellbutrin and BuSpar daily, patient states that she cannot recall if she took lisinopril yesterday or today, but states that she has taken her anxiety medications.  Patients lab work nonactionable.  Patient's plain film nonactionable.  Discussed with patient with Dr. Clemens who agreed with treatment plan moving forward for this patient.  On reevaluation patient states that she is feeling much better.I had a discussion with the patient/family regarding " diagnosis, diagnostic results, treatment plan, and medications.  The patient/family indicated understanding of these instructions.  I spent adequate time at the bedside preceding discharge necessary to personally discuss the aftercare instructions, giving patient education, providing explanations of the results of our evaluations/findings, and my decision making to assure that the patient/family understand the plan of care.  Time was allotted to answer questions at that time and throughout the ED course.  Emphasis was placed on timely follow-up after discharge.  I also discussed the potential for the development of an acute emergent condition requiring further evaluation, admission, or even surgical intervention. I discussed that we found nothing during the visit today indicating the need for further workup, admission, or the presence of an unstable medical condition.  I encouraged the patient to return to the emergency department immediately for ANY concerns, worsening, new complaints, or if symptoms persist and unable to seek follow-up in a timely fashion.  The patient/family expressed understanding and agreement with this plan.  The patient will follow-up with primary care provider for reevaluation.     MEDICATIONS ADMINISTERED IN ED:  Medications   sodium chloride 0.9 % flush 10 mL (has no administration in time range)   aspirin chewable tablet 324 mg (324 mg Oral Given 7/22/25 2201)       PROCEDURES:  Procedures          CRITICAL CARE TIME    Total Critical Care time was 0 minutes, excluding separately reportable procedures.   There was a high probability of clinically significant/life threatening deterioration in the patient's condition which required my urgent intervention.    FINAL IMPRESSION      1. Chest pain, unspecified type    2. Anxiety          DISPOSITION/PLAN     ED Disposition       ED Disposition   Discharge    Condition   Stable    Comment   --               PATIENT REFERRED TO:  Mariann Vega  DO Bridgett  [address]  [place]  [facility] [address]  [phone]            DISCHARGE MEDICATIONS:     Medication List        CONTINUE taking these medications      buPROPion  MG 24 hr tablet  Commonly known as: Wellbutrin XL  Take 1 tablet by mouth Every Morning.     busPIRone 7.5 MG tablet  Commonly known as: BUSPAR  Take 1 tablet by mouth 3 (Three) Times a Day.     EPINEPHrine 0.3 MG/0.3ML solution auto-injector injection  Commonly known as: EpiPen 2-Abdirahman  Use x 1 if needed for sting reaction     lisinopril-hydrochlorothiazide 10-12.5 MG per tablet  Commonly known as: Zestoretic  Take 1 tablet by mouth Daily.              Comment: Please note this report has been produced using speech recognition software.      Lolita Mejia PA-C

## 2025-07-23 NOTE — DISCHARGE INSTRUCTIONS
Patient was evaluated, labs and imaging were obtained.  No acute abnormalities were noted.  Patient advised to follow-up with their PCP, call to schedule an appointment.  Return to the ER for worsening of symptoms.  Take medication as prescribed.  Patient has no further questions or concerns at discharge.

## 2025-07-24 LAB
QT INTERVAL: 378 MS
QT INTERVAL: 398 MS
QTC INTERVAL: 393 MS
QTC INTERVAL: 400 MS

## 2025-07-25 ENCOUNTER — OFFICE VISIT (OUTPATIENT)
Dept: FAMILY MEDICINE CLINIC | Facility: CLINIC | Age: 45
End: 2025-07-25
Payer: COMMERCIAL

## 2025-07-25 VITALS
BODY MASS INDEX: 37.47 KG/M2 | TEMPERATURE: 98 F | HEIGHT: 69 IN | DIASTOLIC BLOOD PRESSURE: 80 MMHG | WEIGHT: 253 LBS | HEART RATE: 68 BPM | RESPIRATION RATE: 18 BRPM | OXYGEN SATURATION: 100 % | SYSTOLIC BLOOD PRESSURE: 138 MMHG

## 2025-07-25 DIAGNOSIS — F41.9 ANXIETY: Primary | ICD-10-CM

## 2025-07-25 DIAGNOSIS — Z12.31 ENCOUNTER FOR SCREENING MAMMOGRAM FOR MALIGNANT NEOPLASM OF BREAST: ICD-10-CM

## 2025-07-25 RX ORDER — LORAZEPAM 0.5 MG/1
0.5 TABLET ORAL EVERY 8 HOURS PRN
Qty: 15 TABLET | Refills: 0 | Status: SHIPPED | OUTPATIENT
Start: 2025-07-25

## 2025-07-25 RX ORDER — METHOCARBAMOL 500 MG/1
500 TABLET, FILM COATED ORAL
COMMUNITY
Start: 2025-03-03

## 2025-07-25 NOTE — PROGRESS NOTES
Chief Complaint  ER f/u    Deyanira Bustos presents to [facility]    History of Present Illness  The patient presents for an ER visit.    She was seen at [facility] ER on 07/22/2024 for chest pain. She experienced symptoms of pressure in her face, lightheadedness, and elevated blood pressure, which she attributes to stress. Her blood pressure was recorded as 178/89 or 90 at [facility]. Despite taking anxiety medication and practicing deep breathing exercises, her blood pressure remained high. She also reported chest heaviness, difficulty breathing, arm pain, and tingling. She noticed that her veins appeared unusually bright, like neon blue and green markers. She managed to continue working for about 3 hours before seeking medical attention. By the time she reached the hospital, her blood pressure had normalized. She was advised to take the next day off work. She also reported a recurrence of facial pressure during a phone call with her father, which subsided after 20 minutes of calming down.     She has been under significant stress due to her parents' health issues, which has led to frequent crying episodes. Her mother has severe Alzheimer's disease, and her father recently had a stroke, resulting in blindness in his right eye and partial vision in his left eye. She is considering placing her mother in a memory care facility but is unsure if this decision triggered her anxiety. She reports feeling overwhelmed and experiencing high stress levels. She is currently taking bupropion and buspirone as needed for mood management. She is unsure if these medications are effective. She has been trying to manage her anxiety independently and has not sought counseling or therapy. She has been engaging in physical activity, such as running, to help manage her stress. She is concerned about the potential risk of stroke or heart attack due to her high blood pressure and  stress levels.    She missed two doses of her blood pressure medication due to uncertainty about whether she had taken it the previous day.    She has been putting off her mammogram and rectal surgery.    FAMILY HISTORY  Her mother has Alzheimer's disease.  Her father had another stroke and went blind in his right eye.      The following portions of the patient's history were reviewed and updated as appropriate: allergies, current medications, past family history, past medical history, past social history, past surgical history, and problem list.    Objective      Physical Exam  Vitals and nursing note reviewed.   Cardiovascular:      Rate and Rhythm: Normal rate and regular rhythm.      Heart sounds: Normal heart sounds.   Pulmonary:      Effort: Pulmonary effort is normal.      Breath sounds: Normal breath sounds.   Neurological:      Mental Status: She is alert.   Psychiatric:         Mood and Affect: Mood normal.        Physical Exam      Result Review :   The following data was reviewed by: Mariann Vega DO on 07/25/2025:  CMP          11/8/2024    19:40 3/3/2025    17:56 7/22/2025    21:30   CMP   Glucose 99   100    BUN 12   14.8    Creatinine 0.83   0.85    EGFR 88.2   85.2    Sodium 138   139    Potassium 3.9  3.6     4.2    Chloride 103   103    Calcium 9.7   9.6    Total Protein 7.1   6.8    Albumin 4.3   4.1    Globulin 2.8   2.7    Total Bilirubin 0.3   0.4    Alkaline Phosphatase 66   66    AST (SGOT) 16   25    ALT (SGPT) 14   21    Albumin/Globulin Ratio 1.5   1.5    BUN/Creatinine Ratio 14.5   17.4    Anion Gap 7.0   11.8       Details          This result is from an external source.             CBC w/diff          11/8/2024    19:40 3/3/2025    17:05 7/22/2025    21:30   CBC w/Diff   WBC 9.24  7.94     9.18    RBC 5.01  5.00     5.03    Hemoglobin 14.5  14.1     14.6    Hematocrit 43.2  43.1     44.0    MCV 86.2  86     87.5    MCH 28.9  28.2     29.0    MCHC 33.6  32.7     33.2    RDW 12.6   13.1     12.8    Platelets 254  212     204    Neutrophil Rel % 65.1   67.5    Immature Granulocyte Rel % 0.2   0.2    Lymphocyte Rel % 26.7   24.3    Monocyte Rel % 6.6   7.0    Eosinophil Rel % 1.1   0.8    Basophil Rel % 0.3   0.2       Details          This result is from an external source.             Results  Labs   - Troponins: Normal   - D-dimer: Negative   - BNP: Normal   - Lipase: Normal   - Blood count panel: Looked good, not anemic   - White blood cell count: Okay   - Metabolic panel: Slightly elevated glucose (non-fasting), electrolytes look good, kidney function look good, liver enzymes normal             Assessment and Plan    Diagnoses and all orders for this visit:    1. Anxiety (Primary)  -     LORazepam (ATIVAN) 0.5 MG tablet; Take 1 tablet by mouth Every 8 (Eight) Hours As Needed for Anxiety.  Dispense: 15 tablet; Refill: 0    2. Encounter for screening mammogram for malignant neoplasm of breast  -     Mammo Screening Digital Tomosynthesis Bilateral With CAD; Future      Assessment & Plan  1. Anxiety.  - Anxiety appears to be exacerbated by stress, which in turn elevates blood pressure.  - Current regimen includes bupropion and buspirone, taken as needed. Effectiveness of Wellbutrin is uncertain.  - Discussed the potential benefits of taking buspirone more consistently, at least twice daily.  - Prescription for lorazepam provided, to be used as needed. If adverse reactions occur, propranolol will be considered as an alternative.  - DEANDRE query complete. Treatment plan to include limited course of prescribed controlled substance. Risks including addiction, benefits, and alternatives presented to patient.     2. Hypertension.  - Blood pressure is generally well-controlled, except during periods of extreme stress.  - Recent ER visit on 07/22/2024 due to elevated blood pressure (178/89 or 90).  - Advised to continue current blood pressure medication regimen and monitor blood pressure regularly.  -  If blood pressure remains elevated despite these measures, further adjustments to medication may be necessary.    3. Health Maintenance.  - Advised to schedule a mammogram at one of the [facility] locations.          Follow Up   Return in about 3 months (around 10/25/2025) for Recheck HTN and anxiety .    Patient or patient representative verbalized consent for the use of Ambient Listening during the visit with  Mariann Vega DO for chart documentation. 7/25/2025  17:59 EDT  Patient was given instructions and counseling regarding her condition or for health maintenance advice. Please see specific information pulled into the AVS if appropriate.

## 2025-07-25 NOTE — PROGRESS NOTES
Chief Complaint  ER f/u    Subjective     {Problem List  Visit Diagnosis   Encounters  Notes  Medications  Labs  Result Review Imaging  Media :23}     Elisabeth Bustos presents to [facility]  History of Present Illness      {Common H&P Review Areas:91619}    Objective      Physical Exam   Result Review :{Labs  Result Review  Imaging  Med Tab  Media :23}   {The following data was reviewed by (Optional):95393}  {Ambulatory Labs (Optional):18855}           Assessment and Plan {CC Problem List  Visit Diagnosis  ROS  Review (Popup)  Health Maintenance  Quality  BestPractice  Medications  SmartSets  SnapShot Encounters  Media :23}   Diagnoses and all orders for this visit:    1. Anxiety (Primary)  -     LORazepam (ATIVAN) 0.5 MG tablet; Take 1 tablet by mouth Every 8 (Eight) Hours As Needed for Anxiety.  Dispense: 15 tablet; Refill: 0      {Time Spent (Optional):52470}  Follow Up {Instructions Charge Capture  Follow-up Communications :23}  No follow-ups on file.  Patient was given instructions and counseling regarding her condition or for health maintenance advice. Please see specific information pulled into the AVS if appropriate.